# Patient Record
Sex: FEMALE | Race: WHITE | NOT HISPANIC OR LATINO | Employment: FULL TIME
[De-identification: names, ages, dates, MRNs, and addresses within clinical notes are randomized per-mention and may not be internally consistent; named-entity substitution may affect disease eponyms.]

---

## 2017-06-24 ENCOUNTER — HEALTH MAINTENANCE LETTER (OUTPATIENT)
Age: 47
End: 2017-06-24

## 2017-06-26 DIAGNOSIS — I50.22 CHRONIC SYSTOLIC CONGESTIVE HEART FAILURE (H): ICD-10-CM

## 2017-06-27 RX ORDER — LOSARTAN POTASSIUM 50 MG/1
50 TABLET ORAL 2 TIMES DAILY
Qty: 60 TABLET | Refills: 3 | Status: SHIPPED | OUTPATIENT
Start: 2017-06-27

## 2017-09-21 DIAGNOSIS — I50.22 CHRONIC SYSTOLIC CONGESTIVE HEART FAILURE (H): ICD-10-CM

## 2017-09-21 RX ORDER — LOSARTAN POTASSIUM 50 MG/1
50 TABLET ORAL 2 TIMES DAILY
Qty: 60 TABLET | Refills: 3 | Status: CANCELLED | OUTPATIENT
Start: 2017-09-21

## 2017-09-22 NOTE — TELEPHONE ENCOUNTER
Refill request for losartan denied.  According to last note in chart from 9/2016, pt is no longer followed by Dr. Duke at the [facility].  She is followed by Dr. Carlo Cosme in [place], [place].  [employer] called today and notified of this change.

## 2019-02-15 DIAGNOSIS — I50.22 CHRONIC SYSTOLIC (CONGESTIVE) HEART FAILURE (H): ICD-10-CM

## 2019-02-18 RX ORDER — METOPROLOL SUCCINATE 100 MG/1
100 TABLET, EXTENDED RELEASE ORAL DAILY
Qty: 30 TABLET | Refills: 3 | OUTPATIENT
Start: 2019-02-18

## 2019-02-18 NOTE — TELEPHONE ENCOUNTER
Refill request for Metoprolol denied.  According to last note in chart from 9/2016, pt is no longer followed by Dr. Duke at the [facility].  She is followed by Dr. Carlo Cosme in [place], [place]. [employer] notified.

## 2019-12-16 ENCOUNTER — HEALTH MAINTENANCE LETTER (OUTPATIENT)
Age: 49
End: 2019-12-16

## 2020-01-07 ENCOUNTER — TRANSFERRED RECORDS (OUTPATIENT)
Dept: MULTI SPECIALTY CLINIC | Facility: CLINIC | Age: 50
End: 2020-01-07

## 2020-01-07 LAB — PAP-ABSTRACT: NORMAL

## 2021-01-15 ENCOUNTER — HEALTH MAINTENANCE LETTER (OUTPATIENT)
Age: 51
End: 2021-01-15

## 2021-01-24 ENCOUNTER — HEALTH MAINTENANCE LETTER (OUTPATIENT)
Age: 51
End: 2021-01-24

## 2021-09-04 ENCOUNTER — HEALTH MAINTENANCE LETTER (OUTPATIENT)
Age: 51
End: 2021-09-04

## 2021-09-19 ENCOUNTER — APPOINTMENT (OUTPATIENT)
Dept: GENERAL RADIOLOGY | Facility: CLINIC | Age: 51
End: 2021-09-19
Attending: FAMILY MEDICINE
Payer: COMMERCIAL

## 2021-09-19 ENCOUNTER — HOSPITAL ENCOUNTER (EMERGENCY)
Facility: CLINIC | Age: 51
Discharge: HOME | End: 2021-09-19
Attending: FAMILY MEDICINE | Admitting: FAMILY MEDICINE
Payer: COMMERCIAL

## 2021-09-19 ENCOUNTER — ANESTHESIA (OUTPATIENT)
Dept: EMERGENCY MEDICINE | Facility: CLINIC | Age: 51
End: 2021-09-19
Payer: COMMERCIAL

## 2021-09-19 ENCOUNTER — ANESTHESIA EVENT (OUTPATIENT)
Dept: EMERGENCY MEDICINE | Facility: CLINIC | Age: 51
End: 2021-09-19
Payer: COMMERCIAL

## 2021-09-19 VITALS
SYSTOLIC BLOOD PRESSURE: 127 MMHG | BODY MASS INDEX: 44.3 KG/M2 | HEIGHT: 63 IN | TEMPERATURE: 98 F | OXYGEN SATURATION: 98 % | WEIGHT: 250 LBS | RESPIRATION RATE: 5 BRPM | HEART RATE: 78 BPM | DIASTOLIC BLOOD PRESSURE: 81 MMHG

## 2021-09-19 DIAGNOSIS — I48.92 ATRIAL FLUTTER WITH RAPID VENTRICULAR RESPONSE (H): ICD-10-CM

## 2021-09-19 LAB
ALBUMIN SERPL-MCNC: 3.6 G/DL (ref 3.4–5)
ALP SERPL-CCNC: 94 U/L (ref 40–150)
ALT SERPL W P-5'-P-CCNC: 26 U/L (ref 0–50)
ANION GAP SERPL CALCULATED.3IONS-SCNC: 5 MMOL/L (ref 3–14)
APTT PPP: 49 SECONDS (ref 22–38)
AST SERPL W P-5'-P-CCNC: 12 U/L (ref 0–45)
BASOPHILS # BLD AUTO: 0.1 10E3/UL (ref 0–0.2)
BASOPHILS NFR BLD AUTO: 1 %
BILIRUB SERPL-MCNC: 0.4 MG/DL (ref 0.2–1.3)
BUN SERPL-MCNC: 13 MG/DL (ref 7–30)
CALCIUM SERPL-MCNC: 8.4 MG/DL (ref 8.5–10.1)
CHLORIDE BLD-SCNC: 110 MMOL/L (ref 94–109)
CO2 SERPL-SCNC: 25 MMOL/L (ref 20–32)
CREAT SERPL-MCNC: 0.75 MG/DL (ref 0.52–1.04)
EOSINOPHIL # BLD AUTO: 0.1 10E3/UL (ref 0–0.7)
EOSINOPHIL NFR BLD AUTO: 2 %
ERYTHROCYTE [DISTWIDTH] IN BLOOD BY AUTOMATED COUNT: 12.9 % (ref 10–15)
GFR SERPL CREATININE-BSD FRML MDRD: >90 ML/MIN/1.73M2
GLUCOSE BLD-MCNC: 99 MG/DL (ref 70–99)
HCT VFR BLD AUTO: 40.2 % (ref 35–47)
HGB BLD-MCNC: 13.3 G/DL (ref 11.7–15.7)
HOLD SPECIMEN: NORMAL
IMM GRANULOCYTES # BLD: 0 10E3/UL
IMM GRANULOCYTES NFR BLD: 0 %
INR PPP: 2.12 (ref 0.85–1.15)
LYMPHOCYTES # BLD AUTO: 2.6 10E3/UL (ref 0.8–5.3)
LYMPHOCYTES NFR BLD AUTO: 32 %
MCH RBC QN AUTO: 31.4 PG (ref 26.5–33)
MCHC RBC AUTO-ENTMCNC: 33.1 G/DL (ref 31.5–36.5)
MCV RBC AUTO: 95 FL (ref 78–100)
MONOCYTES # BLD AUTO: 0.5 10E3/UL (ref 0–1.3)
MONOCYTES NFR BLD AUTO: 7 %
NEUTROPHILS # BLD AUTO: 4.6 10E3/UL (ref 1.6–8.3)
NEUTROPHILS NFR BLD AUTO: 58 %
NRBC # BLD AUTO: 0 10E3/UL
NRBC BLD AUTO-RTO: 0 /100
PLATELET # BLD AUTO: 337 10E3/UL (ref 150–450)
POTASSIUM BLD-SCNC: 3.7 MMOL/L (ref 3.4–5.3)
PROT SERPL-MCNC: 7.4 G/DL (ref 6.8–8.8)
RBC # BLD AUTO: 4.24 10E6/UL (ref 3.8–5.2)
SODIUM SERPL-SCNC: 140 MMOL/L (ref 133–144)
TROPONIN I SERPL-MCNC: <0.015 UG/L (ref 0–0.04)
TSH SERPL DL<=0.005 MIU/L-ACNC: 2.19 MU/L (ref 0.4–4)
WBC # BLD AUTO: 7.9 10E3/UL (ref 4–11)

## 2021-09-19 PROCEDURE — 84484 ASSAY OF TROPONIN QUANT: CPT | Performed by: FAMILY MEDICINE

## 2021-09-19 PROCEDURE — 84443 ASSAY THYROID STIM HORMONE: CPT | Performed by: FAMILY MEDICINE

## 2021-09-19 PROCEDURE — 85730 THROMBOPLASTIN TIME PARTIAL: CPT | Performed by: FAMILY MEDICINE

## 2021-09-19 PROCEDURE — 250N000013 HC RX MED GY IP 250 OP 250 PS 637: Performed by: FAMILY MEDICINE

## 2021-09-19 PROCEDURE — 96365 THER/PROPH/DIAG IV INF INIT: CPT | Mod: 59 | Performed by: FAMILY MEDICINE

## 2021-09-19 PROCEDURE — 93005 ELECTROCARDIOGRAM TRACING: CPT | Mod: 59 | Performed by: FAMILY MEDICINE

## 2021-09-19 PROCEDURE — 250N000011 HC RX IP 250 OP 636: Performed by: NURSE ANESTHETIST, CERTIFIED REGISTERED

## 2021-09-19 PROCEDURE — 36415 COLL VENOUS BLD VENIPUNCTURE: CPT | Performed by: FAMILY MEDICINE

## 2021-09-19 PROCEDURE — 92960 CARDIOVERSION ELECTRIC EXT: CPT | Performed by: FAMILY MEDICINE

## 2021-09-19 PROCEDURE — 85610 PROTHROMBIN TIME: CPT | Performed by: FAMILY MEDICINE

## 2021-09-19 PROCEDURE — 99285 EMERGENCY DEPT VISIT HI MDM: CPT | Mod: 25 | Performed by: FAMILY MEDICINE

## 2021-09-19 PROCEDURE — 71045 X-RAY EXAM CHEST 1 VIEW: CPT

## 2021-09-19 PROCEDURE — 96375 TX/PRO/DX INJ NEW DRUG ADDON: CPT | Mod: 59 | Performed by: FAMILY MEDICINE

## 2021-09-19 PROCEDURE — 93010 ELECTROCARDIOGRAM REPORT: CPT | Mod: 59 | Performed by: FAMILY MEDICINE

## 2021-09-19 PROCEDURE — 80053 COMPREHEN METABOLIC PANEL: CPT | Performed by: FAMILY MEDICINE

## 2021-09-19 PROCEDURE — 96366 THER/PROPH/DIAG IV INF ADDON: CPT | Mod: 59 | Performed by: FAMILY MEDICINE

## 2021-09-19 PROCEDURE — 370N000003 HC ANESTHESIA WARD SERVICE

## 2021-09-19 PROCEDURE — 250N000009 HC RX 250: Performed by: FAMILY MEDICINE

## 2021-09-19 PROCEDURE — 85025 COMPLETE CBC W/AUTO DIFF WBC: CPT | Performed by: FAMILY MEDICINE

## 2021-09-19 RX ORDER — DILTIAZEM HCL IN NACL,ISO-OSM 125 MG/125
5-15 PLASTIC BAG, INJECTION (ML) INTRAVENOUS CONTINUOUS
Status: DISCONTINUED | OUTPATIENT
Start: 2021-09-19 | End: 2021-09-20 | Stop reason: HOSPADM

## 2021-09-19 RX ORDER — FAMOTIDINE 20 MG/1
20 TABLET, FILM COATED ORAL EVERY MORNING
COMMUNITY

## 2021-09-19 RX ORDER — METOPROLOL TARTRATE 25 MG/1
25 TABLET, FILM COATED ORAL ONCE
Status: COMPLETED | OUTPATIENT
Start: 2021-09-19 | End: 2021-09-19

## 2021-09-19 RX ORDER — MONTELUKAST SODIUM 10 MG/1
1 TABLET ORAL AT BEDTIME
COMMUNITY
Start: 2021-08-12

## 2021-09-19 RX ORDER — DILTIAZEM HYDROCHLORIDE 5 MG/ML
10 INJECTION INTRAVENOUS ONCE
Status: COMPLETED | OUTPATIENT
Start: 2021-09-19 | End: 2021-09-19

## 2021-09-19 RX ORDER — PROPOFOL 10 MG/ML
INJECTION, EMULSION INTRAVENOUS PRN
Status: DISCONTINUED | OUTPATIENT
Start: 2021-09-19 | End: 2021-09-19

## 2021-09-19 RX ADMIN — METOPROLOL TARTRATE 25 MG: 25 TABLET, FILM COATED ORAL at 17:57

## 2021-09-19 RX ADMIN — PROPOFOL 30 MG: 10 INJECTION, EMULSION INTRAVENOUS at 17:33

## 2021-09-19 RX ADMIN — PROPOFOL 50 MG: 10 INJECTION, EMULSION INTRAVENOUS at 17:30

## 2021-09-19 RX ADMIN — DILTIAZEM HYDROCHLORIDE 10 MG: 5 INJECTION INTRAVENOUS at 15:31

## 2021-09-19 RX ADMIN — PROPOFOL 50 MG: 10 INJECTION, EMULSION INTRAVENOUS at 17:31

## 2021-09-19 RX ADMIN — Medication 5 MG/HR: at 15:35

## 2021-09-19 ASSESSMENT — ENCOUNTER SYMPTOMS
PALPITATIONS: 1
SHORTNESS OF BREATH: 1
HEADACHES: 0
CONSTIPATION: 0
SINUS PRESSURE: 0
CHILLS: 0
DIAPHORESIS: 0
ABDOMINAL PAIN: 0
VOMITING: 0
DYSURIA: 0
BLOOD IN STOOL: 0
FEVER: 0
FREQUENCY: 0
COUGH: 0
NAUSEA: 0
SORE THROAT: 0
DYSRHYTHMIAS: 1
DIARRHEA: 0
WHEEZING: 0

## 2021-09-19 ASSESSMENT — MIFFLIN-ST. JEOR: SCORE: 1708.12

## 2021-09-19 NOTE — ANESTHESIA PREPROCEDURE EVALUATION
"Anesthesia Pre-Procedure Evaluation    Patient: Rocío Key   MRN: [id number] : 1970        Preoperative Diagnosis: * No surgery found *   Procedure :      Past Medical History:   Diagnosis Date     Asthma      Atrial fibrillation (H)     Cardioverted in      Blood transfusion 2000     Cardiac arrest - ventricular fibrillation 2011     Cardiac ICD- [employer], dual chamber- NOT dependant 2011     Chronic systolic congestive heart failure (H) 3/2/2016     Hypertension      Nonischemic cardiomyopathy (H) 2015     Obesity     Lost 100lbs in      S/P ablation of ventricular arrhythmia 2015    PVC ablation      Past Surgical History:   Procedure Laterality Date     CARDIAC SURGERY  11    AICD      GYN SURGERY           H LEAD REVISION DUAL  2011    RA & RV leads     TUBAL LIGATION        Allergies   Allergen Reactions     Codeine Other (See Comments)     \"inhibits my breathing, I gasp\"      Social History     Tobacco Use     Smoking status: Never Smoker     Smokeless tobacco: Never Used   Substance Use Topics     Alcohol use: Yes     Comment: occasionally      Wt Readings from Last 1 Encounters:   21 113.4 kg (250 lb)        Anesthesia Evaluation   Pt has had prior anesthetic. Type: General and MAC.    No history of anesthetic complications       ROS/MED HX  ENT/Pulmonary:     (+) asthma Treatment: Inhaler prn and Inhaler daily,      Neurologic:       Cardiovascular: Comment: Nonischemic cardiomyopathy   PVCs    (+) hypertension-----CHF ICD Reason placed:v fib cardiac arrest. dysrhythmias (ablation x2 for afib and ventricular arrhythmia ), a-fib and Other, Previous cardiac testing   Echo: Date: 19 Results:  Final Conclusion Previous Study: 19    1. No thrombus in the left atrial appendage.      2.  Device lead(s) identified in the right atrium and right ventricle. Small mobile   echodensities seen on atrial portion of the lead similar to "    prior JR.      3. Left ventricular chamber enlargement.Moderate to severely decreased left ventricular   systolic function.    Estimated left ventricular ejection fraction is 30-35%.      4. Normal right ventricular chamber size.Mildly decreased right ventricular systolic function.    Estimated right ventricular systolic pressure is 22.5 mmHg plus right atrial pressure.      5. Mildly thickened mitral valve.Mild-moderate mitral valve regurgitation.      6. Patent foramen ovale with bidirectional shunt.    Mid septal perforation ( due to prior instrumentation) and bidirectional shunt.      7.When compared to the previous echocardiographic images of 6/20/19, there has been no   significant change.    LV function may be underestimated due to tachycardia.    Estimated EF: 30-35%     Stress Test: Date: Results:    ECG Reviewed: Date: 9/19/21 Results:  Atrial fibrillation   -Left bundle branch block and left axis    Sinus rhythm w/ L BBB following cardioversion  Cath: Date: Results:      METS/Exercise Tolerance:     Hematologic:     (+) history of blood transfusion,     Musculoskeletal:       GI/Hepatic:       Renal/Genitourinary:       Endo:     (+) Obesity,     Psychiatric/Substance Use:       Infectious Disease:       Malignancy:       Other:            Physical Exam    Airway        Mallampati: II   TM distance: > 3 FB   Neck ROM: full   Mouth opening: > 3 cm    Respiratory Devices and Support         Dental  no notable dental history         Cardiovascular   cardiovascular exam normal          Pulmonary   pulmonary exam normal                OUTSIDE LABS:  CBC:   Lab Results   Component Value Date    WBC 7.9 09/19/2021    WBC 7.8 08/23/2014    HGB 13.3 09/19/2021    HGB 13.9 08/23/2014    HCT 40.2 09/19/2021    HCT 43.4 08/23/2014     09/19/2021     08/23/2014     BMP:   Lab Results   Component Value Date     09/19/2021     09/08/2015    POTASSIUM 3.7 09/19/2021    POTASSIUM 4.0  09/08/2015    CHLORIDE 110 (H) 09/19/2021    CHLORIDE 104 09/08/2015    CO2 25 09/19/2021    CO2 27 09/08/2015    BUN 13 09/19/2021    BUN 11 09/08/2015    CR 0.75 09/19/2021    CR 0.84 09/08/2015    GLC 99 09/19/2021    GLC 93 09/08/2015     COAGS:   Lab Results   Component Value Date    PTT 49 (H) 09/19/2021    INR 2.12 (H) 09/19/2021     POC:   Lab Results   Component Value Date    BGM 98 07/20/2011     HEPATIC:   Lab Results   Component Value Date    ALBUMIN 3.6 09/19/2021    PROTTOTAL 7.4 09/19/2021    ALT 26 09/19/2021    AST 12 09/19/2021    ALKPHOS 94 09/19/2021    BILITOTAL 0.4 09/19/2021     OTHER:   Lab Results   Component Value Date    PH 7.39 07/19/2011    LACT 0.7 07/20/2011    MARLY 8.4 (L) 09/19/2021    PHOS 3.8 07/20/2011    MAG 1.6 07/23/2011    TSH 2.19 09/19/2021       Anesthesia Plan    ASA Status:  3, emergent    NPO Status:  NPO Appropriate    Anesthesia Type: MAC.              Consents    Anesthesia Plan(s) and associated risks, benefits, and realistic alternatives discussed. Questions answered and patient/representative(s) expressed understanding.     - Discussed with:  Patient         Postoperative Care            Comments:                PAUL Rodas CRNA

## 2021-09-19 NOTE — ANESTHESIA POSTPROCEDURE EVALUATION
Patient: Rocío Key    * No procedures listed *    Diagnosis:* No pre-op diagnosis entered *  Diagnosis Additional Information: No value filed.    Anesthesia Type:  MAC    Note:  Disposition: ED.   Postop Pain Control: Uneventful            Sign Out: Well controlled pain   PONV: No   Neuro/Psych: Uneventful            Sign Out: Acceptable/Baseline neuro status   Airway/Respiratory: Uneventful            Sign Out: Acceptable/Baseline resp. status   CV/Hemodynamics: Uneventful            Sign Out: Acceptable CV status; No obvious hypovolemia; No obvious fluid overload   Other NRE: NONE   DID A NON-ROUTINE EVENT OCCUR? No           Last vitals:  Vitals Value Taken Time   /82 09/19/21 1800   Temp     Pulse 73 09/19/21 1809   Resp 17 09/19/21 1809   SpO2 98 % 09/19/21 1809   Vitals shown include unvalidated device data.    Electronically Signed By: PAUL Rodas CRNA  September 19, 2021  6:10 PM

## 2021-09-19 NOTE — ANESTHESIA CARE TRANSFER NOTE
Patient: Rocío Key    * No procedures listed *    Diagnosis: * No pre-op diagnosis entered *  Diagnosis Additional Information: No value filed.    Anesthesia Type:   MAC     Note:    Oropharynx: oropharynx clear of all foreign objects and spontaneously breathing  Level of Consciousness: awake  Oxygen Supplementation: nasal cannula  Level of Supplemental Oxygen (L/min / FiO2): 3  Independent Airway: airway patency satisfactory and stable  Dentition: dentition unchanged  Vital Signs Stable: post-procedure vital signs reviewed and stable  Report to RN Given: handoff report given  Patient transferred to: Emergency Department    Handoff Report: Identifed the Patient, Identified the Reponsible Provider, Reviewed the pertinent medical history, Discussed the surgical course, Reviewed Intra-OP anesthesia mangement and issues during anesthesia, Set expectations for post-procedure period and Allowed opportunity for questions and acknowledgement of understanding      Vitals:  Vitals Value Taken Time   /82 09/19/21 1800   Temp     Pulse 73 09/19/21 1809   Resp 17 09/19/21 1809   SpO2 98 % 09/19/21 1809   Vitals shown include unvalidated device data.    Electronically Signed By: PAUL Rodas CRNA  September 19, 2021  6:10 PM

## 2021-09-19 NOTE — Clinical Note
Rocío Key was seen and treated in our emergency department on 9/19/2021.  She may return to work on 09/21/2021.       If you have any questions or concerns, please don't hesitate to call.      Paul Lackey MD

## 2021-09-19 NOTE — ED NOTES
Pt successfully cardioverted, propofol used and 1 shock at 120j.  CRNA, MD< ERT< RN in room.   Pt currently awake, alert, talking and VSS.   Repeat ECG being done.

## 2021-09-19 NOTE — ED PROVIDER NOTES
"  History     Chief Complaint   Patient presents with     Atrial Fib     pt has h/o the same.  she had an ablation x 2 years ago and hasn't had an issue since.  she noted a-fib onset x 2 days ago and restarted taking her Xarelto at that time.     HPI  Rocío Key is a 51 year old female who presents with history of paroxysmal atrial fibrillation, cardiomyopathy, hypertension hyperlipidemia and obesity.  Patient had a ventricular fibrillation arrest in 2011.  Placement of a dual-chamber ICD at that time.  Echo demonstrated 2025% ejection fraction and angiogram was without any evidence of coronary obstructive disease.  A cardiac MRI demonstrated 39% ejection fraction at that time myocardial fibrosis.  In 2013 she underwent a left ventricular outflow PVC ablation [facility].  In 2018 she had prolonged atrial fibrillation and underwent JR guided cardioversion.  Left ventricular ejection fraction was 40% at the time.  Has had several cardioversions and then underwent ablation in August 2019.  2 additional cardioversions that were required in September 2018  She has been on as needed Xarelto which she starts when she goes back into atrial fibrillation.  She was last seen January 21, 2021 cardiology.  Has had no cardiopulmonary symptoms recently.    Comorbid conditions include asthma, hyperlipidemia, hypertension, major depression, morbid obesity.      Medications include Lexapro, albuterol, Advair, hydroxyzine, Lexapro, Cozaar, metoprolol, Singulair and Zofran.          Allergies:  Allergies   Allergen Reactions     Codeine Other (See Comments)     \"inhibits my breathing, I gasp\"       Problem List:    Patient Active Problem List    Diagnosis Date Noted     Chronic systolic congestive heart failure (H) 03/02/2016     Priority: Medium     Chronic systolic (congestive) heart failure (H) 11/08/2015     Priority: Medium     Nonischemic cardiomyopathy (H) 08/06/2015     Priority: Medium     S/P ablation of " ventricular arrhythmia 2015     Priority: Medium     PVC ablation       PVC's (premature ventricular contractions) 2014     Priority: Medium     Hypertension      Priority: Medium     Atrial fibrillation (H)      Priority: Medium     Cardiac arrest - ventricular fibrillation      Priority: Medium     Problem list name updated by automated process. Provider to review and confirm       Automatic implantable cardioverter-defibrillator in situ 2011     Priority: Medium     RA lead revision 11  Problem list name updated by automated process. Provider to review       Respiratory failure (H) 2011     Priority: Medium        Past Medical History:    Past Medical History:   Diagnosis Date     Asthma      Atrial fibrillation (H)      Blood transfusion 2000     Cardiac arrest - ventricular fibrillation 2011     Cardiac ICD- [employer], dual chamber- NOT dependant 2011     Chronic systolic congestive heart failure (H) 3/2/2016     Hypertension      Nonischemic cardiomyopathy (H) 2015     Obesity      S/P ablation of ventricular arrhythmia 2015       Past Surgical History:    Past Surgical History:   Procedure Laterality Date     CARDIAC SURGERY  11    AICD      GYN SURGERY  1998         H LEAD REVISION DUAL  2011    RA & RV leads     TUBAL LIGATION         Family History:    Family History   Problem Relation Age of Onset     Cardiovascular Maternal Grandmother         MI     Cardiovascular Maternal Grandfather         MI     Cancer Father 63        Brain cancer       Social History:  Marital Status:   [2]  Social History     Tobacco Use     Smoking status: Never Smoker     Smokeless tobacco: Never Used   Substance Use Topics     Alcohol use: Yes     Comment: occasionally     Drug use: No        Medications:    ADVAIR DISKUS 250-50 MCG/DOSE diskus inhaler  albuterol (PROAIR HFA) 108 (90 BASE) MCG/ACT inhaler  ASPIRIN PO  Calcium Carbonate (CALCIUM 600  "PO)  cholecalciferol (VITAMIN D3) 1000 UNIT capsule  diphenhydrAMINE-acetaminophen (TYLENOL PM EXTRA STRENGTH)  MG tablet  famotidine (PEPCID) 20 MG tablet  losartan (COZAAR) 50 MG tablet  metoprolol (TOPROL-XL) 100 MG 24 hr tablet  montelukast (SINGULAIR) 10 MG tablet  sertraline (ZOLOFT) 50 MG tablet          Review of Systems   Constitutional: Negative for chills, diaphoresis and fever.   HENT: Negative for ear pain, sinus pressure and sore throat.    Eyes: Negative for visual disturbance.   Respiratory: Positive for shortness of breath. Negative for cough and wheezing.    Cardiovascular: Positive for chest pain and palpitations.   Gastrointestinal: Negative for abdominal pain, blood in stool, constipation, diarrhea, nausea and vomiting.   Genitourinary: Negative for dysuria, frequency and urgency.   Skin: Negative for rash.   Neurological: Negative for headaches.   All other systems reviewed and are negative.        Physical Exam   BP: (!) 144/97  Pulse: (!) 129  Temp: 98  F (36.7  C)  Resp: 18  Height: 160 cm (5' 3\")  Weight: 113.4 kg (250 lb)  SpO2: 97 %      Physical Exam  Constitutional:       General: She is in acute distress.      Appearance: She is not diaphoretic.   HENT:      Head: Atraumatic.   Eyes:      Conjunctiva/sclera: Conjunctivae normal.   Cardiovascular:      Rate and Rhythm: Regular rhythm. Tachycardia present.      Pulses: No decreased pulses.      Heart sounds: No murmur heard.     Pulmonary:      Effort: Pulmonary effort is normal. No respiratory distress.      Breath sounds: Normal breath sounds. No stridor. No wheezing or rhonchi.   Abdominal:      General: Abdomen is flat. There is no distension.      Palpations: Abdomen is soft. There is no mass.      Tenderness: There is no abdominal tenderness. There is no guarding.   Musculoskeletal:      Cervical back: Neck supple.      Right lower leg: No edema.      Left lower leg: No edema.   Skin:     Coloration: Skin is not pale.      " Findings: No rash.   Neurological:      General: No focal deficit present.      Mental Status: She is alert.      Motor: No weakness.         ED Course        [facility]    -Cardioversion External    Date/Time: 9/19/2021 11:56 PM  Performed by: Paul Lackey MD  Authorized by: Paul Lackey MD     ED EVALUATION:      I have performed an Emergency Department Evaluation including taking a history and physical examination, this evaluation will be documented in the electronic medical record for this ED encounter.      ASA Class: Class 1- healthy patient    NPO Status: appropriately NPO for procedure  UNIVERSAL PROTOCOL   Site Marked: NA  Prior Images Obtained and Reviewed:  NA  Required items: Required blood products, implants, devices and special equipment available    Patient was reevaluated immediately before administering moderate or deep sedation or anesthesia  Confirmation Checklist:  Patient's identity using two indicators, relevant allergies, procedure was appropriate and matched the consent or emergent situation and correct equipment/implants were available  Time out: Immediately prior to the procedure a time out was called    Universal Protocol: the Joint Commission Universal Protocol was followed    Preparation: Patient was prepped and draped in usual sterile fashion          PRE-PROCEDURE DETAILS:     Cardioversion basis:  Elective    Rhythm:  Atrial flutter    Electrode placement:  Anterior-posterior  Attempt one:     Cardioversion mode:  Synchronous    Waveform:  Biphasic    Shock (Joules):  120    Shock outcome:  Conversion to normal sinus rhythm  Post-procedure details:     Patient status:  Awake    PROCEDURE   Patient Tolerance:  Patient tolerated the procedure well with no immediate complications                       EKG Interpretation:      Interpreted by Paul Lackey MD  EKG done at 1504 hrs. demonstrates a atrial flutter 144 bpm leftward axis.  ST depression  upsloping in the lateral leads.  No T wave change.  Poor R progression V1 through V6.  No ectopy.  Left bundle branch block.  Impression atrial flutter rapid ventricular rate.  Upsloping ST depression in lateral leads           EKG Interpretation:      Interpreted by Paul Lackey MD  EKG done at 1740 hrs. demonstrates a sinus rhythm 77 bpm with a leftward axis and no ST change.  No T wave changes.  There is a poor R progression.  There are no Q waves.  Left bundle branch block.  No ectopy.  Intervals with exception of the prolonged QRS of 152 otherwise are normal.  Impression sinus rhythm with a left bundle branch block left axis and sinus rhythm after cardioversion.    Critical Care time:  none               Results for orders placed or performed during the hospital encounter of 09/19/21 (from the past 24 hour(s))   [place] Draw    Narrative    The following orders were created for panel order [place] Draw.  Procedure                               Abnormality         Status                     ---------                               -----------         ------                     Extra Blue Top Tube[[id number]]                              Final result               Extra Red Top Tube[[id number]]                               Final result               Extra Green Top (Lithium...[[id number]]                      Final result               Extra Purple Top Tube[[id number]]                            Final result               Extra Green Top (Lithium...[[id number]]                      Final result                 Please view results for these tests on the individual orders.   Extra Blue Top Tube   Result Value Ref Range    Hold Specimen JIC    Extra Red Top Tube   Result Value Ref Range    Hold Specimen JIC    Extra Green Top (Lithium Heparin) Tube   Result Value Ref Range    Hold Specimen JIC    Extra Purple Top Tube   Result Value Ref Range    Hold Specimen JIC    Extra Green Top (Lithium Heparin) ON ICE   Result  Value Ref Range    Hold Specimen JIC    TSH with free T4 reflex   Result Value Ref Range    TSH 2.19 0.40 - 4.00 mU/L   CBC with platelets differential    Narrative    The following orders were created for panel order CBC with platelets differential.  Procedure                               Abnormality         Status                     ---------                               -----------         ------                     CBC with platelets and d...[[id number]]                      Final result                 Please view results for these tests on the individual orders.   Comprehensive metabolic panel   Result Value Ref Range    Sodium 140 133 - 144 mmol/L    Potassium 3.7 3.4 - 5.3 mmol/L    Chloride 110 (H) 94 - 109 mmol/L    Carbon Dioxide (CO2) 25 20 - 32 mmol/L    Anion Gap 5 3 - 14 mmol/L    Urea Nitrogen 13 7 - 30 mg/dL    Creatinine 0.75 0.52 - 1.04 mg/dL    Calcium 8.4 (L) 8.5 - 10.1 mg/dL    Glucose 99 70 - 99 mg/dL    Alkaline Phosphatase 94 40 - 150 U/L    AST 12 0 - 45 U/L    ALT 26 0 - 50 U/L    Protein Total 7.4 6.8 - 8.8 g/dL    Albumin 3.6 3.4 - 5.0 g/dL    Bilirubin Total 0.4 0.2 - 1.3 mg/dL    GFR Estimate >90 >60 mL/min/1.73m2   Troponin I   Result Value Ref Range    Troponin I <0.015 0.000 - 0.045 ug/L   PTT   Result Value Ref Range    aPTT 49 (H) 22 - 38 Seconds   INR   Result Value Ref Range    INR 2.12 (H) 0.85 - 1.15   CBC with platelets and differential   Result Value Ref Range    WBC Count 7.9 4.0 - 11.0 10e3/uL    RBC Count 4.24 3.80 - 5.20 10e6/uL    Hemoglobin 13.3 11.7 - 15.7 g/dL    Hematocrit 40.2 35.0 - 47.0 %    MCV 95 78 - 100 fL    MCH 31.4 26.5 - 33.0 pg    MCHC 33.1 31.5 - 36.5 g/dL    RDW 12.9 10.0 - 15.0 %    Platelet Count 337 150 - 450 10e3/uL    % Neutrophils 58 %    % Lymphocytes 32 %    % Monocytes 7 %    % Eosinophils 2 %    % Basophils 1 %    % Immature Granulocytes 0 %    NRBCs per 100 WBC 0 <1 /100    Absolute Neutrophils 4.6 1.6 - 8.3 10e3/uL    Absolute Lymphocytes  2.6 0.8 - 5.3 10e3/uL    Absolute Monocytes 0.5 0.0 - 1.3 10e3/uL    Absolute Eosinophils 0.1 0.0 - 0.7 10e3/uL    Absolute Basophils 0.1 0.0 - 0.2 10e3/uL    Absolute Immature Granulocytes 0.0 <=0.0 10e3/uL    Absolute NRBCs 0.0 10e3/uL   XR Chest Port 1 View    Narrative    EXAM: XR CHEST PORT 1 VIEW  LOCATION: [facility]  DATE/TIME: 9/19/2021 3:43 PM    INDICATION: atrial fib rvbr  COMPARISON: 08/23/2014       Impression    IMPRESSION: No pleural fluid or pneumothorax. No airspace disease or edema. Normal size of the heart. There is a left chest wall pacemaker.        Medications   diltiazem (CARDIZEM) 125 mg in sodium chloride 0.7 % 125 mL infusion (5 mg/hr Intravenous New Bag 9/19/21 1535)   diltiazem (CARDIZEM) injection 10 mg (10 mg Intravenous Given 9/19/21 1531)       Assessments & Plan (with Medical Decision Making)     MDM:  Rocío Key is a 51 year old female presents with a complicated history of paroxysmal atrial fibrillation, cardiomyopathy, hypertension hyperlipidemia and obesity.  Patient had a ventricular fibrillation arrest in 2011.  Placement of a dual-chamber ICD at that time.  She subsequently had atrial fibrillation ablation and she has had multiple cardioversions in the past.   She presents here with atrial fibrillation that she thought had onset on Friday but from discussing with tach after interrogation of her pacer it appears that onset may have been today.  In any event it has been within 48 hours and she has started Eliquis.  We therefore discussed the option for cardioversion which she wished to have.  While waiting for cardioversion I initiated diltiazem.  During this time heart rates were still elevated and her predominant rhythm was atrial flutter.  She responded well to 120 J under conscious sedation with a single shock that resulted in return to sinus rhythm.  She was observed for approximately 2 hours after this and had no complications.  Her EKG  remains left bundle branch block with which it has been.  Her laboratory testing is reassuring.  I have given precautions for return.    She will need to be on anticoagulation with Xarelto for approximately 4 weeks.  I asked her to discuss this further with her cardiologist.    I have reviewed the nursing notes.    I have reviewed the findings, diagnosis, plan and need for follow up with the patient.       New Prescriptions    No medications on file       Final diagnoses:   Atrial flutter with rapid ventricular response (H) - take your xarelto for 4 weeks.  get  the additional medd  from your  cardiologist or primary.   return for recurrent rapid rate. take your metoprolol tonight at getting home.       9/19/2021   [facility] EMERGENCY DEPT     Paul Lackey MD  09/20/21 0002

## 2021-09-20 NOTE — DISCHARGE INSTRUCTIONS
ICD-10-CM    1. Atrial flutter with rapid ventricular response (H)  I48.92     take your xarelto for 4 weeks.  get  the additional medd  from your  cardiologist or primary.   return for recurrent rapid rate. take your metoprolol tonight at getting home.

## 2022-02-19 ENCOUNTER — HEALTH MAINTENANCE LETTER (OUTPATIENT)
Age: 52
End: 2022-02-19

## 2022-10-22 ENCOUNTER — HEALTH MAINTENANCE LETTER (OUTPATIENT)
Age: 52
End: 2022-10-22

## 2022-10-30 ENCOUNTER — ANESTHESIA (OUTPATIENT)
Dept: EMERGENCY MEDICINE | Facility: CLINIC | Age: 52
End: 2022-10-30
Payer: COMMERCIAL

## 2022-10-30 ENCOUNTER — ANESTHESIA EVENT (OUTPATIENT)
Dept: EMERGENCY MEDICINE | Facility: CLINIC | Age: 52
End: 2022-10-30
Payer: COMMERCIAL

## 2022-10-30 ENCOUNTER — HOSPITAL ENCOUNTER (EMERGENCY)
Facility: CLINIC | Age: 52
Discharge: HOME | End: 2022-10-30
Attending: EMERGENCY MEDICINE | Admitting: EMERGENCY MEDICINE
Payer: COMMERCIAL

## 2022-10-30 VITALS
OXYGEN SATURATION: 98 % | TEMPERATURE: 97.8 F | HEART RATE: 66 BPM | RESPIRATION RATE: 12 BRPM | DIASTOLIC BLOOD PRESSURE: 86 MMHG | WEIGHT: 265 LBS | SYSTOLIC BLOOD PRESSURE: 128 MMHG | BODY MASS INDEX: 46.95 KG/M2 | HEIGHT: 63 IN

## 2022-10-30 DIAGNOSIS — I48.91 ATRIAL FIBRILLATION WITH RAPID VENTRICULAR RESPONSE (H): ICD-10-CM

## 2022-10-30 LAB
ALBUMIN SERPL BCG-MCNC: 3.9 G/DL (ref 3.5–5.2)
ALP SERPL-CCNC: 103 U/L (ref 35–104)
ALT SERPL W P-5'-P-CCNC: 19 U/L (ref 10–35)
ANION GAP SERPL CALCULATED.3IONS-SCNC: 10 MMOL/L (ref 7–15)
AST SERPL W P-5'-P-CCNC: 20 U/L (ref 10–35)
BASOPHILS # BLD AUTO: 0.1 10E3/UL (ref 0–0.2)
BASOPHILS NFR BLD AUTO: 1 %
BILIRUB SERPL-MCNC: 0.3 MG/DL
BUN SERPL-MCNC: 16.6 MG/DL (ref 6–20)
CALCIUM SERPL-MCNC: 9.6 MG/DL (ref 8.6–10)
CHLORIDE SERPL-SCNC: 104 MMOL/L (ref 98–107)
CREAT SERPL-MCNC: 0.65 MG/DL (ref 0.51–0.95)
DEPRECATED HCO3 PLAS-SCNC: 25 MMOL/L (ref 22–29)
EOSINOPHIL # BLD AUTO: 0.3 10E3/UL (ref 0–0.7)
EOSINOPHIL NFR BLD AUTO: 4 %
ERYTHROCYTE [DISTWIDTH] IN BLOOD BY AUTOMATED COUNT: 12.5 % (ref 10–15)
GFR SERPL CREATININE-BSD FRML MDRD: >90 ML/MIN/1.73M2
GLUCOSE SERPL-MCNC: 117 MG/DL (ref 70–99)
HCT VFR BLD AUTO: 41.9 % (ref 35–47)
HGB BLD-MCNC: 13.8 G/DL (ref 11.7–15.7)
HOLD SPECIMEN: NORMAL
IMM GRANULOCYTES # BLD: 0 10E3/UL
IMM GRANULOCYTES NFR BLD: 0 %
LYMPHOCYTES # BLD AUTO: 1.9 10E3/UL (ref 0.8–5.3)
LYMPHOCYTES NFR BLD AUTO: 28 %
MCH RBC QN AUTO: 31 PG (ref 26.5–33)
MCHC RBC AUTO-ENTMCNC: 32.9 G/DL (ref 31.5–36.5)
MCV RBC AUTO: 94 FL (ref 78–100)
MONOCYTES # BLD AUTO: 0.3 10E3/UL (ref 0–1.3)
MONOCYTES NFR BLD AUTO: 5 %
NEUTROPHILS # BLD AUTO: 4.2 10E3/UL (ref 1.6–8.3)
NEUTROPHILS NFR BLD AUTO: 62 %
NRBC # BLD AUTO: 0 10E3/UL
NRBC BLD AUTO-RTO: 0 /100
PLATELET # BLD AUTO: 300 10E3/UL (ref 150–450)
POTASSIUM SERPL-SCNC: 4.3 MMOL/L (ref 3.4–5.3)
PROT SERPL-MCNC: 6.9 G/DL (ref 6.4–8.3)
RBC # BLD AUTO: 4.45 10E6/UL (ref 3.8–5.2)
SODIUM SERPL-SCNC: 139 MMOL/L (ref 136–145)
TROPONIN T SERPL HS-MCNC: <6 NG/L
WBC # BLD AUTO: 6.8 10E3/UL (ref 4–11)

## 2022-10-30 PROCEDURE — 93010 ELECTROCARDIOGRAM REPORT: CPT | Mod: 59 | Performed by: EMERGENCY MEDICINE

## 2022-10-30 PROCEDURE — 370N000003 HC ANESTHESIA WARD SERVICE

## 2022-10-30 PROCEDURE — 99285 EMERGENCY DEPT VISIT HI MDM: CPT | Mod: 25 | Performed by: EMERGENCY MEDICINE

## 2022-10-30 PROCEDURE — 93005 ELECTROCARDIOGRAM TRACING: CPT | Performed by: EMERGENCY MEDICINE

## 2022-10-30 PROCEDURE — 80053 COMPREHEN METABOLIC PANEL: CPT | Performed by: EMERGENCY MEDICINE

## 2022-10-30 PROCEDURE — 85025 COMPLETE CBC W/AUTO DIFF WBC: CPT | Performed by: EMERGENCY MEDICINE

## 2022-10-30 PROCEDURE — 250N000011 HC RX IP 250 OP 636: Performed by: NURSE ANESTHETIST, CERTIFIED REGISTERED

## 2022-10-30 PROCEDURE — 92960 CARDIOVERSION ELECTRIC EXT: CPT | Performed by: EMERGENCY MEDICINE

## 2022-10-30 PROCEDURE — 84484 ASSAY OF TROPONIN QUANT: CPT | Performed by: EMERGENCY MEDICINE

## 2022-10-30 PROCEDURE — 36415 COLL VENOUS BLD VENIPUNCTURE: CPT | Performed by: EMERGENCY MEDICINE

## 2022-10-30 RX ORDER — PROPOFOL 10 MG/ML
INJECTION, EMULSION INTRAVENOUS PRN
Status: DISCONTINUED | OUTPATIENT
Start: 2022-10-30 | End: 2022-10-30

## 2022-10-30 RX ADMIN — PROPOFOL 100 MG: 10 INJECTION, EMULSION INTRAVENOUS at 09:38

## 2022-10-30 ASSESSMENT — ACTIVITIES OF DAILY LIVING (ADL)
ADLS_ACUITY_SCORE: 37
ADLS_ACUITY_SCORE: 37

## 2022-10-30 ASSESSMENT — ENCOUNTER SYMPTOMS: DYSRHYTHMIAS: 1

## 2022-10-30 NOTE — ED PROVIDER NOTES
"  History     Chief Complaint   Patient presents with     Tachycardia     HPI  Rocío Key is a 52 year old female who presents with atrial fibrillation rapid ventricular response beginning at 730 this morning.  She has long history of paroxysmal atrial fibrillation, cardiology notes, last visit here with Dr. Lackey reviewed in detail in epic.  Has distant history of ventricular fibrillation arrest, dual-lead ICD placement, attempts at atrial fibrillation ablation.  Maintained on metoprolol dose of which she took this morning.  Reportedly had ICD check couple weeks ago without evidence for dysrhythmia or defibrillator discharge, reportedly had echocardiogram 2 weeks ago as well.  Denies recent febrile illness cough congestion sore throat nausea vomiting diarrhea fever.  Describes feeling lightheaded this morning, tested positive for COVID 9/25 and symptoms have resolved.  Took Xarelto 1 dose this morning upon noting palpitations.  Does not smoke drink alcohol or use illicit substances.      Echo 10/21/2022   Final Conclusion Previous Study: 6/20/2019    1. Normal left ventricular size with mildly reduced systolic function (estimated LVEF 40-45%).   Abnormal septal motion due to pacing.    2. Normal right ventricular size and systolic function.    3. Grade 1 left ventricular diastolic dysfunction consistent with abnormal myocardial   relaxation and normal left ventricular filling    pressure.    4. Mild left atrial enlargement.    5. No significant valvular heart disease.    6. Compared to previous study, 6/20/19, LVEF appears slightly improved.           Allergies:  Allergies   Allergen Reactions     Codeine Other (See Comments)     \"inhibits my breathing, I gasp\"       Problem List:    Patient Active Problem List    Diagnosis Date Noted     Chronic systolic congestive heart failure (H) 03/02/2016     Priority: Medium     Chronic systolic (congestive) heart failure (H) 11/08/2015     Priority: Medium     " Nonischemic cardiomyopathy (H) 2015     Priority: Medium     S/P ablation of ventricular arrhythmia 2015     Priority: Medium     PVC ablation       PVC's (premature ventricular contractions) 2014     Priority: Medium     Hypertension      Priority: Medium     Atrial fibrillation (H)      Priority: Medium     Cardiac arrest - ventricular fibrillation      Priority: Medium     Problem list name updated by automated process. Provider to review and confirm       Automatic implantable cardioverter-defibrillator in situ 2011     Priority: Medium     RA lead revision 11  Problem list name updated by automated process. Provider to review       Respiratory failure (H) 2011     Priority: Medium        Past Medical History:    Past Medical History:   Diagnosis Date     Asthma      Atrial fibrillation (H)      Blood transfusion 2000     Cardiac arrest - ventricular fibrillation 2011     Cardiac ICD- [employer], dual chamber- NOT dependant 2011     Chronic systolic congestive heart failure (H) 3/2/2016     Hypertension      Nonischemic cardiomyopathy (H) 2015     Obesity      S/P ablation of ventricular arrhythmia 2015       Past Surgical History:    Past Surgical History:   Procedure Laterality Date     CARDIAC SURGERY  11    AICD      GYN SURGERY  1998         H LEAD REVISION DUAL  2011    RA & RV leads     TUBAL LIGATION         Family History:    Family History   Problem Relation Age of Onset     Cardiovascular Maternal Grandmother         MI     Cardiovascular Maternal Grandfather         MI     Cancer Father 63        Brain cancer       Social History:  Marital Status:   [2]  Social History     Tobacco Use     Smoking status: Never     Smokeless tobacco: Never   Substance Use Topics     Alcohol use: Yes     Comment: occasionally     Drug use: No        Medications:    rivaroxaban ANTICOAGULANT (XARELTO) 20 MG TABS tablet  ADVAIR DISKUS 250-50  "MCG/DOSE diskus inhaler  albuterol (PROAIR HFA) 108 (90 BASE) MCG/ACT inhaler  ASPIRIN PO  Calcium Carbonate (CALCIUM 600 PO)  cholecalciferol (VITAMIN D3) 1000 UNIT capsule  diphenhydrAMINE-acetaminophen (TYLENOL PM EXTRA STRENGTH)  MG tablet  famotidine (PEPCID) 20 MG tablet  losartan (COZAAR) 50 MG tablet  metoprolol (TOPROL-XL) 100 MG 24 hr tablet  montelukast (SINGULAIR) 10 MG tablet  sertraline (ZOLOFT) 50 MG tablet          Review of Systems  All other systems reviewed and are negative.    Physical Exam   BP: (!) 138/101  Pulse: (!) 140  Temp: 97.8  F (36.6  C)  Resp: 22  Height: 160 cm (5' 3\")  Weight: 120.2 kg (265 lb)  SpO2: 97 %      Physical Exam  Nontoxic appearing no respiratory distress alert and oriented ×3  Head atraumatic normocephalic  Skin pale warm and dry  Neck supple full active painless range of motion  Lungs clear to auscultation  Heart irregular regular tachycardic no murmur  Abdomen soft nontender bowel sounds positive   Strength and sensation grossly intact throughout the extremities, gait and station normal  Speech is fluent, good eye contact, thought processes are rational  Lower extremities without swelling, redness or tenderness  Pedal pulses symmetrical and strong    ED Course          Atrial fibrillation rate 138, left bundle branch block, no acute changes otherwise appreciated, unchanged from previous episodes of A. fib, old left bundle branch block, read by Dr. Paul Nobles    Repeat ECG at 10:00 normal sinus rhythm rate 74, left bundle branch block, no acute changes, read by Dr. Paul Nobles     [facility]    -Cardioversion External    Date/Time: 10/30/2022 9:49 AM  Performed by: Paul Nobles MD  Authorized by: Paul Nobles MD     Risks, benefits and alternatives discussed.    ED EVALUATION:      I have performed an Emergency Department Evaluation including taking a history and physical examination, this evaluation will be documented " in the electronic medical record for this ED encounter.      ASA Class: Class 1- healthy patient    Mallampati: Grade 3- soft palate visible, posterior pharyngeal wall not visible    NPO Status: appropriately NPO for procedure    UNIVERSAL PROTOCOL   Site Marked: NA  Prior Images Obtained and Reviewed:  NA  Required items: Required blood products, implants, devices and special equipment available    Patient identity confirmed:  Verbally with patient, arm band and provided demographic data  Patient was reevaluated immediately before administering moderate or deep sedation or anesthesia  Confirmation Checklist:  Patient's identity using two indicators, relevant allergies, procedure was appropriate and matched the consent or emergent situation and correct equipment/implants were available  Time out: Immediately prior to the procedure a time out was called    Universal Protocol: the [facility] Universal Protocol was followed    Preparation: Patient was prepped and draped in usual sterile fashion      PRE-PROCEDURE DETAILS:     Rhythm:  Atrial fibrillation    Electrode placement:  Anterior-posterior  Attempt one:     Cardioversion mode:  Synchronous    Waveform:  Biphasic    Shock (Joules):  120    Shock outcome:  Conversion to normal sinus rhythm  Post-procedure details:     Patient status:  Awake      PROCEDURE    Patient Tolerance:  Patient tolerated the procedure well with no immediate complications  Length of time physician/provider present for 1:1 monitoring during sedation: 10                Critical Care time:  none               Results for orders placed or performed during the hospital encounter of 10/30/22 (from the past 24 hour(s))   [place] Draw    Narrative    The following orders were created for panel order [place] Draw.  Procedure                               Abnormality         Status                     ---------                               -----------         ------                     Extra  Blue Top Tube[[id number]]                              Final result               Extra Red Top Tube[[id number]]                               Final result               Extra Green Top (Lithium...[[id number]]                      Final result               Extra Purple Top Tube[[id number]]                            Final result                 Please view results for these tests on the individual orders.   Extra Blue Top Tube   Result Value Ref Range    Hold Specimen JIC    Extra Red Top Tube   Result Value Ref Range    Hold Specimen JIC    Extra Green Top (Lithium Heparin) Tube   Result Value Ref Range    Hold Specimen JIC    Extra Purple Top Tube   Result Value Ref Range    Hold Specimen JIC    CBC with platelets differential    Narrative    The following orders were created for panel order CBC with platelets differential.  Procedure                               Abnormality         Status                     ---------                               -----------         ------                     CBC with platelets and d...[[id number]]                      Final result                 Please view results for these tests on the individual orders.   Comprehensive metabolic panel   Result Value Ref Range    Sodium 139 136 - 145 mmol/L    Potassium 4.3 3.4 - 5.3 mmol/L    Chloride 104 98 - 107 mmol/L    Carbon Dioxide (CO2) 25 22 - 29 mmol/L    Anion Gap 10 7 - 15 mmol/L    Urea Nitrogen 16.6 6.0 - 20.0 mg/dL    Creatinine 0.65 0.51 - 0.95 mg/dL    Calcium 9.6 8.6 - 10.0 mg/dL    Glucose 117 (H) 70 - 99 mg/dL    Alkaline Phosphatase 103 35 - 104 U/L    AST 20 10 - 35 U/L    ALT 19 10 - 35 U/L    Protein Total 6.9 6.4 - 8.3 g/dL    Albumin 3.9 3.5 - 5.2 g/dL    Bilirubin Total 0.3 <=1.2 mg/dL    GFR Estimate >90 >60 mL/min/1.73m2   Troponin T, High Sensitivity   Result Value Ref Range    Troponin T, High Sensitivity <6 <=14 ng/L   CBC with platelets and differential   Result Value Ref Range    WBC Count 6.8 4.0 - 11.0  10e3/uL    RBC Count 4.45 3.80 - 5.20 10e6/uL    Hemoglobin 13.8 11.7 - 15.7 g/dL    Hematocrit 41.9 35.0 - 47.0 %    MCV 94 78 - 100 fL    MCH 31.0 26.5 - 33.0 pg    MCHC 32.9 31.5 - 36.5 g/dL    RDW 12.5 10.0 - 15.0 %    Platelet Count 300 150 - 450 10e3/uL    % Neutrophils 62 %    % Lymphocytes 28 %    % Monocytes 5 %    % Eosinophils 4 %    % Basophils 1 %    % Immature Granulocytes 0 %    NRBCs per 100 WBC 0 <1 /100    Absolute Neutrophils 4.2 1.6 - 8.3 10e3/uL    Absolute Lymphocytes 1.9 0.8 - 5.3 10e3/uL    Absolute Monocytes 0.3 0.0 - 1.3 10e3/uL    Absolute Eosinophils 0.3 0.0 - 0.7 10e3/uL    Absolute Basophils 0.1 0.0 - 0.2 10e3/uL    Absolute Immature Granulocytes 0.0 <=0.4 10e3/uL    Absolute NRBCs 0.0 10e3/uL       Medications - No data to display    Assessments & Plan (with Medical Decision Making)  52-year-old female with recurrent atrial fibrillation rapid ventricular response details per HPI.  Uncomplicated synchronized elective cardioversion.  Took a dose of Xarelto 20 mg this morning.  Patient is reviewed with Dr. Morales cardiology who recommends 3 months anticoagulation.  Patient's rivaroxaban prescription is refilled for 30 days, she has cardiology visit scheduled for later in November and will discuss ongoing anticoagulation in the context of paroxysmal atrial fibrillation with rapid ventricular response.  Continue other medications.  Avoid NSAIDs.  Return criteria reviewed     I have reviewed the nursing notes.    I have reviewed the findings, diagnosis, plan and need for follow up with the patient.       New Prescriptions    RIVAROXABAN ANTICOAGULANT (XARELTO) 20 MG TABS TABLET    Take 1 tablet (20 mg) by mouth daily (with dinner)       Final diagnoses:   Atrial fibrillation with rapid ventricular response (H)       10/30/2022   [facility] EMERGENCY DEPT     Paul Nobles MD  10/30/22 0691

## 2022-10-30 NOTE — DISCHARGE INSTRUCTIONS
Continue current meds    Follow-up cardiology as scheduled    Return here for chest pain, palpitations, passing out or any other concern    Recommend continuing Xarelto, per cardiology consult today for 3 months if not indefinitely.  This is a good discussion to have with your cardiologist at next visit.

## 2022-10-30 NOTE — ANESTHESIA POSTPROCEDURE EVALUATION
Patient: Rocío Key    Procedure: * No procedures listed *       Anesthesia Type:  MAC    Note:  Disposition: Outpatient   Postop Pain Control: Uneventful            Sign Out: Well controlled pain   PONV: No   Neuro/Psych: Uneventful            Sign Out: Acceptable/Baseline neuro status   Airway/Respiratory: Uneventful            Sign Out: Acceptable/Baseline resp. status   CV/Hemodynamics: Uneventful            Sign Out: Acceptable CV status; No obvious hypovolemia; No obvious fluid overload   Other NRE: NONE   DID A NON-ROUTINE EVENT OCCUR? No           Last vitals:  Vitals:    10/30/22 1000 10/30/22 1005 10/30/22 1015   BP:  108/61 124/75   Pulse: 77 71 71   Resp:  10 14   Temp:      SpO2:  94% 97%       Electronically Signed By: PAUL Gallegos CRNA  October 30, 2022  10:33 AM

## 2022-10-30 NOTE — ANESTHESIA CARE TRANSFER NOTE
Patient: Rocío Key    Procedure: * No procedures listed *       Diagnosis: * No pre-op diagnosis entered *  Diagnosis Additional Information: No value filed.    Anesthesia Type:   MAC     Note:      Level of Consciousness: awake  Oxygen Supplementation: nasal cannula    Independent Airway: airway patency satisfactory and stable    Vital Signs Stable: post-procedure vital signs reviewed and stable  Report to RN Given: handoff report given  Patient transferred to: Emergency Department    Handoff Report: Identifed the Patient, Identified the Reponsible Provider, Reviewed the pertinent medical history, Discussed the surgical course, Reviewed Intra-OP anesthesia mangement and issues during anesthesia, Set expectations for post-procedure period and Allowed opportunity for questions and acknowledgement of understanding      Vitals:  Vitals Value Taken Time   /86 10/30/22 1030   Temp     Pulse 70 10/30/22 1032   Resp 14 10/30/22 1032   SpO2 96 % 10/30/22 1032   Vitals shown include unvalidated device data.    Electronically Signed By: PAUL Gallegos CRNA  October 30, 2022  10:33 AM

## 2022-10-30 NOTE — ANESTHESIA PREPROCEDURE EVALUATION
"Anesthesia Pre-Procedure Evaluation    Patient: Rocío Key   MRN: [id number] : 1970        Preoperative Diagnosis: * No surgery found *   Procedure :      Past Medical History:   Diagnosis Date     Asthma      Atrial fibrillation (H)     Cardioverted in      Blood transfusion 2000     Cardiac arrest - ventricular fibrillation 2011     Cardiac ICD- [employer], dual chamber- NOT dependant 2011     Chronic systolic congestive heart failure (H) 3/2/2016     Hypertension      Nonischemic cardiomyopathy (H) 2015     Obesity     Lost 100lbs in      S/P ablation of ventricular arrhythmia 2015    PVC ablation      Past Surgical History:   Procedure Laterality Date     CARDIAC SURGERY  11    AICD      GYN SURGERY           H LEAD REVISION DUAL  2011    RA & RV leads     TUBAL LIGATION        Allergies   Allergen Reactions     Codeine Other (See Comments)     \"inhibits my breathing, I gasp\"      Social History     Tobacco Use     Smoking status: Never     Smokeless tobacco: Never   Substance Use Topics     Alcohol use: Yes     Comment: occasionally      Wt Readings from Last 1 Encounters:   10/30/22 120.2 kg (265 lb)        Anesthesia Evaluation   Pt has had prior anesthetic. Type: General and MAC.    No history of anesthetic complications       ROS/MED HX  ENT/Pulmonary:     (+) asthma Treatment: Inhaler prn and Inhaler daily,      Neurologic:       Cardiovascular: Comment: Nonischemic cardiomyopathy   PVCs    (+) hypertension-----CHF ICD Reason placed:v fib cardiac arrest. dysrhythmias (ablation x2 for afib and ventricular arrhythmia ), a-fib and Other, Previous cardiac testing   Echo: Date: 19 Results:  Final Conclusion Previous Study: 19    1. No thrombus in the left atrial appendage.      2.  Device lead(s) identified in the right atrium and right ventricle. Small mobile   echodensities seen on atrial portion of the lead similar to    prior JR. "      3. Left ventricular chamber enlargement.Moderate to severely decreased left ventricular   systolic function.    Estimated left ventricular ejection fraction is 30-35%.      4. Normal right ventricular chamber size.Mildly decreased right ventricular systolic function.    Estimated right ventricular systolic pressure is 22.5 mmHg plus right atrial pressure.      5. Mildly thickened mitral valve.Mild-moderate mitral valve regurgitation.      6. Patent foramen ovale with bidirectional shunt.    Mid septal perforation ( due to prior instrumentation) and bidirectional shunt.      7.When compared to the previous echocardiographic images of 6/20/19, there has been no   significant change.    LV function may be underestimated due to tachycardia.    Estimated EF: 30-35%     Stress Test: Date: Results:    ECG Reviewed: Date: 9/19/21 Results:  Atrial fibrillation   -Left bundle branch block and left axis    Sinus rhythm w/ L BBB following cardioversion  Cath: Date: Results:      METS/Exercise Tolerance:     Hematologic:     (+) history of blood transfusion,     Musculoskeletal:       GI/Hepatic:       Renal/Genitourinary:       Endo:     (+) Obesity,     Psychiatric/Substance Use:       Infectious Disease:       Malignancy:       Other:            Physical Exam    Airway        Mallampati: II   TM distance: > 3 FB   Neck ROM: full   Mouth opening: > 3 cm    Respiratory Devices and Support         Dental  no notable dental history         Cardiovascular   cardiovascular exam normal          Pulmonary   pulmonary exam normal                OUTSIDE LABS:  CBC:   Lab Results   Component Value Date    WBC 7.9 09/19/2021    WBC 7.8 08/23/2014    HGB 13.3 09/19/2021    HGB 13.9 08/23/2014    HCT 40.2 09/19/2021    HCT 43.4 08/23/2014     09/19/2021     08/23/2014     BMP:   Lab Results   Component Value Date     09/19/2021     09/08/2015    POTASSIUM 3.7 09/19/2021    POTASSIUM 4.0 09/08/2015    CHLORIDE  110 (H) 09/19/2021    CHLORIDE 104 09/08/2015    CO2 25 09/19/2021    CO2 27 09/08/2015    BUN 13 09/19/2021    BUN 11 09/08/2015    CR 0.75 09/19/2021    CR 0.84 09/08/2015    GLC 99 09/19/2021    GLC 93 09/08/2015     COAGS:   Lab Results   Component Value Date    PTT 49 (H) 09/19/2021    INR 2.12 (H) 09/19/2021     POC:   Lab Results   Component Value Date    BGM 98 07/20/2011     HEPATIC:   Lab Results   Component Value Date    ALBUMIN 3.6 09/19/2021    PROTTOTAL 7.4 09/19/2021    ALT 26 09/19/2021    AST 12 09/19/2021    ALKPHOS 94 09/19/2021    BILITOTAL 0.4 09/19/2021     OTHER:   Lab Results   Component Value Date    PH 7.39 07/19/2011    LACT 0.7 07/20/2011    MARLY 8.4 (L) 09/19/2021    PHOS 3.8 07/20/2011    MAG 1.6 07/23/2011    TSH 2.19 09/19/2021       Anesthesia Plan    ASA Status:  3, emergent    NPO Status:  NPO Appropriate    Anesthesia Type: MAC.              Consents    Anesthesia Plan(s) and associated risks, benefits, and realistic alternatives discussed. Questions answered and patient/representative(s) expressed understanding.    - Discussed:     - Discussed with:  Patient         Postoperative Care            Comments:                    PAUL Gallegos CRNA

## 2022-11-14 ENCOUNTER — APPOINTMENT (OUTPATIENT)
Dept: ULTRASOUND IMAGING | Facility: CLINIC | Age: 52
End: 2022-11-14
Attending: FAMILY MEDICINE
Payer: COMMERCIAL

## 2022-11-14 ENCOUNTER — HOSPITAL ENCOUNTER (EMERGENCY)
Facility: CLINIC | Age: 52
Discharge: HOME | End: 2022-11-14
Attending: FAMILY MEDICINE | Admitting: FAMILY MEDICINE
Payer: COMMERCIAL

## 2022-11-14 VITALS
SYSTOLIC BLOOD PRESSURE: 175 MMHG | HEART RATE: 78 BPM | DIASTOLIC BLOOD PRESSURE: 84 MMHG | TEMPERATURE: 97.4 F | RESPIRATION RATE: 18 BRPM | BODY MASS INDEX: 46.94 KG/M2 | WEIGHT: 265 LBS | OXYGEN SATURATION: 98 %

## 2022-11-14 DIAGNOSIS — I80.8 SUPERFICIAL THROMBOPHLEBITIS OF LEFT UPPER EXTREMITY: ICD-10-CM

## 2022-11-14 PROCEDURE — 99284 EMERGENCY DEPT VISIT MOD MDM: CPT | Mod: 25 | Performed by: FAMILY MEDICINE

## 2022-11-14 PROCEDURE — 99282 EMERGENCY DEPT VISIT SF MDM: CPT | Performed by: FAMILY MEDICINE

## 2022-11-14 PROCEDURE — 93971 EXTREMITY STUDY: CPT | Mod: LT

## 2022-11-14 ASSESSMENT — ACTIVITIES OF DAILY LIVING (ADL): ADLS_ACUITY_SCORE: 37

## 2022-11-14 NOTE — Clinical Note
Rocío Key was seen and treated in our emergency department on 11/14/2022.  She may return to work on 11/15/2022.  may be unable to type due  to wrist/hand medical concern - this may be  a factor for 7 days,     If you have any questions or concerns, please don't hesitate to call.      Paul Lackey MD

## 2022-11-14 NOTE — ED NOTES
IV removed on Thursday, swelling present on Friday.  Painful to touch distally from IV insertion site.  Pt states she has used ice over the weekend.  Pt was in hospital for new onset of afib.  Pt is A&Ox4.  VSS.  Afebrile.

## 2022-11-14 NOTE — ED PROVIDER NOTES
"  History     Chief Complaint   Patient presents with     Hand Problem     HPI  Rocío Key is a 52 year old female who presents with a history of atrial fibrillation hypertension prior V. fib cardiac arrest.  Implanted defibrillator history of ablation for ventricular arrhythmia admitted to [facility] for atrial fibrillation for 2 days from last Wednesday to Friday had an IV in place some pain at the IV site proximal to the wrist crease dorsum.  Since the IV has been removed she is developed swelling minimal erythema in the region.  No associated fever pain in this region.  She has no recent erythema up the arm.  He has no history of VTE.    Allergies:  Allergies   Allergen Reactions     Codeine Other (See Comments)     \"inhibits my breathing, I gasp\"       Problem List:    Patient Active Problem List    Diagnosis Date Noted     Chronic systolic congestive heart failure (H) 03/02/2016     Priority: Medium     Chronic systolic (congestive) heart failure (H) 11/08/2015     Priority: Medium     Nonischemic cardiomyopathy (H) 08/06/2015     Priority: Medium     S/P ablation of ventricular arrhythmia 08/06/2015     Priority: Medium     PVC ablation       PVC's (premature ventricular contractions) 02/20/2014     Priority: Medium     Hypertension      Priority: Medium     Atrial fibrillation (H)      Priority: Medium     Cardiac arrest - ventricular fibrillation      Priority: Medium     Problem list name updated by automated process. Provider to review and confirm       Automatic implantable cardioverter-defibrillator in situ 07/22/2011     Priority: Medium     RA lead revision 12/5/11  Problem list name updated by automated process. Provider to review       Respiratory failure (H) 07/19/2011     Priority: Medium        Past Medical History:    Past Medical History:   Diagnosis Date     Asthma      Atrial fibrillation (H) 2005     Blood transfusion 11/2000     Cardiac arrest - ventricular fibrillation 7/2011 "     Cardiac ICD- [employer], dual chamber- NOT dependant 2011     Chronic systolic congestive heart failure (H) 3/2/2016     Hypertension      Nonischemic cardiomyopathy (H) 2015     Obesity      S/P ablation of ventricular arrhythmia 2015       Past Surgical History:    Past Surgical History:   Procedure Laterality Date     CARDIAC SURGERY  11    AICD      GYN SURGERY           H LEAD REVISION DUAL  2011    RA & RV leads     TUBAL LIGATION         Family History:    Family History   Problem Relation Age of Onset     Cardiovascular Maternal Grandmother         MI     Cardiovascular Maternal Grandfather         MI     Cancer Father 63        Brain cancer       Social History:  Marital Status:   [2]  Social History     Tobacco Use     Smoking status: Never     Smokeless tobacco: Never   Substance Use Topics     Alcohol use: Yes     Comment: occasionally     Drug use: No        Medications:    ADVAIR DISKUS 250-50 MCG/DOSE diskus inhaler  albuterol (PROAIR HFA) 108 (90 BASE) MCG/ACT inhaler  ASPIRIN PO  Calcium Carbonate (CALCIUM 600 PO)  cholecalciferol (VITAMIN D3) 1000 UNIT capsule  diphenhydrAMINE-acetaminophen (TYLENOL PM EXTRA STRENGTH)  MG tablet  famotidine (PEPCID) 20 MG tablet  losartan (COZAAR) 50 MG tablet  metoprolol (TOPROL-XL) 100 MG 24 hr tablet  montelukast (SINGULAIR) 10 MG tablet  rivaroxaban ANTICOAGULANT (XARELTO) 20 MG TABS tablet  sertraline (ZOLOFT) 50 MG tablet          Review of Systems   ROS:  5 point ROS negative except as noted above in HPI, including Gen., Resp., CV, GI &  system review.      Physical Exam   BP: (!) 175/84  Pulse: 78  Temp: 97.4  F (36.3  C)  Resp: 18  Weight: 120.2 kg (265 lb)  SpO2: 98 %      Physical Exam   Radial pulses are symmetric.  There is a swelling that is more generalized in the dorsum of the left wrist.  There is no obvious significant erythema there may be a very slight amount of erythema overlying this region  but not at the site of insertion.  She notes this but it is more of a possible pinkish discoloration and no induration no signs of infection.  I do not detect an obvious fluctuance or organized mass below the surface.  She has normal ulnar median radial nerve function motor or sensory.  Remainder of her examination is reassuring.  Lungs are clear to auscultation.  Heart regular rate and rhythm.    ED Course                 Procedures              Critical Care time:  none               Results for orders placed or performed during the hospital encounter of 11/14/22 (from the past 24 hour(s))   US Upper Extremity Venous Duplex Left    Narrative    ULTRASOUND LEFT UPPER EXTREMITY VENOUS DUPLEX  11/14/2022 11:07 AM    CLINICAL HISTORY: Left arm swelling following IV placement. Evaluate  for DVT, superficial thrombophlebitis.    TECHNIQUE: Venous Duplex ultrasound of the left upper extremity with  (when possible) and without compression, augmentation, and duplex.  Color flow and spectral Doppler with waveform analysis performed.    COMPARISON: None.    FINDINGS: Ultrasound includes evaluation of the internal jugular vein,  innominate vein, subclavian vein, axillary vein, and brachial vein.  The superficial cephalic and basilic veins were also evaluated where  seen.     LEFT: No deep venous thrombosis. Positive occlusive superficial venous  thrombus within the cephalic vein in the region of swelling at the  left hand.       Impression    IMPRESSION: Positive occlusive superficial venous thrombosis at the  cephalic vein in the region of swelling at the left hand. No deep  venous system thrombosis identified.    TANIA WOOD MD         SYSTEM ID:  [id number]       Medications - No data to display    Assessments & Plan (with Medical Decision Making)     MDM; Rocío DONOHUE Collins Key is a 52 year old female who presents with a history that is complex and prior ventricular tachycardia ventricular fibrillation arrest has a  defibrillator in place and recently had A. fib and converted last week.  Has been home and her only complaint now is swelling at the dorsal wrist and hand.  No obvious signs of infection although tender to touch no obvious abscess fluctuation induration.  She has no obvious cords that I can palpate and she is on Eliquis for her atrial fibrillation.  We will still plan for an ultrasound of the region to exclude DVT or superficial thrombophlebitis.    We discussed the distal superficial clot.  I made recommendations as below.  No change in the dosing on her DOAC.  This is a lower dose than would use for VTE but I typically would not treat this distal superficial thrombophlebitis with anticoagulation.  It does have the potential risk of propagating but less likely because she is on DOAC.  We discussed management as below.  I given precautions for return.  We discussed symptomatic management of this.  I have reviewed the nursing notes.    I have reviewed the findings, diagnosis, plan and need for follow up with the patient.       New Prescriptions    No medications on file       Final diagnoses:   Superficial thrombophlebitis of left upper extremity - wam soaks to the area. this should clear ralph.  as you arre on a fib dose xarelto (lower dose than we would use for a DVT).  recheck in clinic on friday as scheduled.  superficial clots can propogate up the arm to the deep system but this one should resolve without complications,       11/14/2022   [facility] EMERGENCY DEPT     Paul Lackey MD  11/14/22 [id number]

## 2022-11-14 NOTE — DISCHARGE INSTRUCTIONS
ICD-10-CM    1. Superficial thrombophlebitis of left upper extremity  I80.8     wam soaks to the area. this should clear ralph.  as you arre on a fib dose xarelto (lower dose than we would use for a DVT).  recheck in clinic on friday as scheduled.  superficial clots can propogate up the arm to the deep system but this one should resolve without complications,

## 2023-04-01 ENCOUNTER — HEALTH MAINTENANCE LETTER (OUTPATIENT)
Age: 53
End: 2023-04-01

## 2023-06-01 ENCOUNTER — HEALTH MAINTENANCE LETTER (OUTPATIENT)
Age: 53
End: 2023-06-01

## 2023-07-21 ENCOUNTER — APPOINTMENT (OUTPATIENT)
Dept: ULTRASOUND IMAGING | Facility: CLINIC | Age: 53
End: 2023-07-21
Attending: EMERGENCY MEDICINE
Payer: COMMERCIAL

## 2023-07-21 ENCOUNTER — HOSPITAL ENCOUNTER (EMERGENCY)
Facility: CLINIC | Age: 53
Discharge: HOME | End: 2023-07-21
Attending: EMERGENCY MEDICINE | Admitting: EMERGENCY MEDICINE
Payer: COMMERCIAL

## 2023-07-21 ENCOUNTER — TELEPHONE (OUTPATIENT)
Dept: OBGYN | Facility: CLINIC | Age: 53
End: 2023-07-21

## 2023-07-21 VITALS
RESPIRATION RATE: 18 BRPM | HEIGHT: 63 IN | HEART RATE: 66 BPM | SYSTOLIC BLOOD PRESSURE: 151 MMHG | BODY MASS INDEX: 46.07 KG/M2 | WEIGHT: 260 LBS | TEMPERATURE: 97.5 F | DIASTOLIC BLOOD PRESSURE: 85 MMHG | OXYGEN SATURATION: 97 %

## 2023-07-21 DIAGNOSIS — R93.89 ABNORMAL PELVIC ULTRASOUND: ICD-10-CM

## 2023-07-21 DIAGNOSIS — Z79.01 CHRONIC ANTICOAGULATION: ICD-10-CM

## 2023-07-21 DIAGNOSIS — N95.0 POSTMENOPAUSE BLEEDING: ICD-10-CM

## 2023-07-21 LAB
ABO/RH(D): NORMAL
ANION GAP SERPL CALCULATED.3IONS-SCNC: 7 MMOL/L (ref 7–15)
ANTIBODY SCREEN: NEGATIVE
BASOPHILS # BLD AUTO: 0 10E3/UL (ref 0–0.2)
BASOPHILS NFR BLD AUTO: 1 %
BUN SERPL-MCNC: 15.8 MG/DL (ref 6–20)
CALCIUM SERPL-MCNC: 9.5 MG/DL (ref 8.6–10)
CHLORIDE SERPL-SCNC: 106 MMOL/L (ref 98–107)
CREAT SERPL-MCNC: 0.75 MG/DL (ref 0.51–0.95)
DEPRECATED HCO3 PLAS-SCNC: 28 MMOL/L (ref 22–29)
EOSINOPHIL # BLD AUTO: 0.1 10E3/UL (ref 0–0.7)
EOSINOPHIL NFR BLD AUTO: 2 %
ERYTHROCYTE [DISTWIDTH] IN BLOOD BY AUTOMATED COUNT: 12.8 % (ref 10–15)
GFR SERPL CREATININE-BSD FRML MDRD: >90 ML/MIN/1.73M2
GLUCOSE SERPL-MCNC: 100 MG/DL (ref 70–99)
HCT VFR BLD AUTO: 40.3 % (ref 35–47)
HGB BLD-MCNC: 13 G/DL (ref 11.7–15.7)
IMM GRANULOCYTES # BLD: 0 10E3/UL
IMM GRANULOCYTES NFR BLD: 0 %
LYMPHOCYTES # BLD AUTO: 1.5 10E3/UL (ref 0.8–5.3)
LYMPHOCYTES NFR BLD AUTO: 24 %
MCH RBC QN AUTO: 30.7 PG (ref 26.5–33)
MCHC RBC AUTO-ENTMCNC: 32.3 G/DL (ref 31.5–36.5)
MCV RBC AUTO: 95 FL (ref 78–100)
MONOCYTES # BLD AUTO: 0.3 10E3/UL (ref 0–1.3)
MONOCYTES NFR BLD AUTO: 5 %
NEUTROPHILS # BLD AUTO: 4.3 10E3/UL (ref 1.6–8.3)
NEUTROPHILS NFR BLD AUTO: 68 %
NRBC # BLD AUTO: 0 10E3/UL
NRBC BLD AUTO-RTO: 0 /100
PLATELET # BLD AUTO: 304 10E3/UL (ref 150–450)
POTASSIUM SERPL-SCNC: 4.4 MMOL/L (ref 3.4–5.3)
RBC # BLD AUTO: 4.24 10E6/UL (ref 3.8–5.2)
SODIUM SERPL-SCNC: 141 MMOL/L (ref 136–145)
SPECIMEN EXPIRATION DATE: NORMAL
WBC # BLD AUTO: 6.3 10E3/UL (ref 4–11)

## 2023-07-21 PROCEDURE — 258N000003 HC RX IP 258 OP 636: Performed by: EMERGENCY MEDICINE

## 2023-07-21 PROCEDURE — 99284 EMERGENCY DEPT VISIT MOD MDM: CPT | Mod: 25 | Performed by: EMERGENCY MEDICINE

## 2023-07-21 PROCEDURE — 82310 ASSAY OF CALCIUM: CPT | Performed by: EMERGENCY MEDICINE

## 2023-07-21 PROCEDURE — 99284 EMERGENCY DEPT VISIT MOD MDM: CPT | Performed by: EMERGENCY MEDICINE

## 2023-07-21 PROCEDURE — 76830 TRANSVAGINAL US NON-OB: CPT

## 2023-07-21 PROCEDURE — 86901 BLOOD TYPING SEROLOGIC RH(D): CPT | Performed by: EMERGENCY MEDICINE

## 2023-07-21 PROCEDURE — 85025 COMPLETE CBC W/AUTO DIFF WBC: CPT | Performed by: EMERGENCY MEDICINE

## 2023-07-21 PROCEDURE — 36415 COLL VENOUS BLD VENIPUNCTURE: CPT | Performed by: EMERGENCY MEDICINE

## 2023-07-21 PROCEDURE — 96360 HYDRATION IV INFUSION INIT: CPT | Performed by: EMERGENCY MEDICINE

## 2023-07-21 PROCEDURE — 86850 RBC ANTIBODY SCREEN: CPT | Performed by: EMERGENCY MEDICINE

## 2023-07-21 PROCEDURE — 96361 HYDRATE IV INFUSION ADD-ON: CPT | Mod: 59 | Performed by: EMERGENCY MEDICINE

## 2023-07-21 RX ORDER — TRANEXAMIC ACID 650 MG/1
1300 TABLET ORAL 2 TIMES DAILY
Qty: 12 TABLET | Refills: 0 | Status: SHIPPED | OUTPATIENT
Start: 2023-07-21 | End: 2023-07-24

## 2023-07-21 RX ORDER — SODIUM CHLORIDE 9 MG/ML
1000 INJECTION, SOLUTION INTRAVENOUS CONTINUOUS
Status: DISCONTINUED | OUTPATIENT
Start: 2023-07-21 | End: 2023-07-21 | Stop reason: HOSPADM

## 2023-07-21 RX ADMIN — SODIUM CHLORIDE 500 ML: 9 INJECTION, SOLUTION INTRAVENOUS at 08:15

## 2023-07-21 ASSESSMENT — ENCOUNTER SYMPTOMS
CONSTITUTIONAL NEGATIVE: 1
HEMATOLOGIC/LYMPHATIC NEGATIVE: 1
CARDIOVASCULAR NEGATIVE: 1
GASTROINTESTINAL NEGATIVE: 1
ALLERGIC/IMMUNOLOGIC NEGATIVE: 1
EYES NEGATIVE: 1
NEUROLOGICAL NEGATIVE: 1
PSYCHIATRIC NEGATIVE: 1
ENDOCRINE NEGATIVE: 1
RESPIRATORY NEGATIVE: 1
MUSCULOSKELETAL NEGATIVE: 1

## 2023-07-21 ASSESSMENT — ACTIVITIES OF DAILY LIVING (ADL): ADLS_ACUITY_SCORE: 37
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
## 2023-07-21 NOTE — ED TRIAGE NOTES
Pt here with vaginal bleeding since Wednesday. Pt has not had her period in two years. The bleeding is getting worse, she was up every hour tonight. Slight tenderness to lower abdomen.      Triage Assessment       Row Name 07/21/23 0742       Triage Assessment (Adult)    Airway WDL WDL       Respiratory WDL    Respiratory WDL WDL       Cardiac WDL    Cardiac WDL WDL       Cognitive/Neuro/Behavioral WDL    Cognitive/Neuro/Behavioral WDL WDL

## 2023-07-21 NOTE — ED NOTES
Patient verbalized vaginal bleeding for past 2 days.  Denies pain, reports occasional abdominal cramping.  Patient verbalized fall out of chair 2 weeks ago.

## 2023-07-21 NOTE — DISCHARGE INSTRUCTIONS
1) Your patient today revealed that your bleeding is coming from a thickened endometrium that require further diagnostic work-up including biopsy to be coordinated by gynecology.  After discussion with the gynecologist on-call plan is for the team to call you for an appointment next week.    2) Pending evaluation with gynecology you are placed on a medication-TXA to take twice a day over the next 5 days to help with bleeding.    3) We have discussed the importance of notifying the team prior to your biopsy that you are on anticoagulation with  Xarelto for history of atrial fibrillation.    4) You appear stable for discharge to home at this time however if you develop lightheadedness or fainting, or any new concerns you should return to be evaluated

## 2023-07-21 NOTE — TELEPHONE ENCOUNTER
----- Message from Nuvia Erickson DO sent at 7/21/2023 10:05 AM CDT -----  Regarding: ER follow up  This patient was in the ED today and needs OBGYN follow up. She needs to be seen for a consult and endometrial biopsy. Can you please reach out to her to see if she has an OBGYN to see, or help her get scheduled for us. I can see her when on call on 8/3 in the PM, or if anyone else has earlier follow up.    Thanks,  Nuvia Erickson DO

## 2023-07-21 NOTE — TELEPHONE ENCOUNTER
Calling pt to schedule ED follow-up consult and EMB    Pt scheduled for 1430 on 8/3/23 with Dr. Georgina Delarosa, RN  [facility]

## 2023-07-21 NOTE — LETTER
July 21, 2023      To Whom It May Concern:      Rocío Key was seen in our Emergency Department today, 07/21/23. Please excuse for missing work due to needing to be evaluated in the emergency department.  In the event that symptoms worsen she may need to return to be evaluated.  This work note is valid until 7/31/23 pending further evaluation and care.      Sincerely,             Temo Hyman MD

## 2023-07-24 NOTE — TELEPHONE ENCOUNTER
Patient calling back today to mention that she forgot to disclose at the time of scheduling that she is currently taking Xarelto and would like to discuss concerns with that medication prior to undergoing EMB on 08/03/23.     Informed patient I would send a message to the [place] RNs and they would give patient a call back.     Routing to [place] Triage.       Samira Robbins/her/hers  [facility] Scheduler

## 2023-07-24 NOTE — TELEPHONE ENCOUNTER
Return call to patient.  Spoke with patient on the phone.    Notified patient that she can discuss further with Dr. Erickson at office visit.  Typically an EMB can be completed while on the medication and nothing further is needed.     Pt in agreement and reports understanding.    Tana CORDOVA   [facility]

## 2023-08-09 ENCOUNTER — TELEPHONE (OUTPATIENT)
Dept: OBGYN | Facility: CLINIC | Age: 53
End: 2023-08-09
Payer: COMMERCIAL

## 2024-06-02 ENCOUNTER — HEALTH MAINTENANCE LETTER (OUTPATIENT)
Age: 54
End: 2024-06-02

## 2024-06-16 ENCOUNTER — HOSPITAL ENCOUNTER (EMERGENCY)
Facility: CLINIC | Age: 54
Discharge: HOME | End: 2024-06-16
Attending: STUDENT IN AN ORGANIZED HEALTH CARE EDUCATION/TRAINING PROGRAM | Admitting: STUDENT IN AN ORGANIZED HEALTH CARE EDUCATION/TRAINING PROGRAM
Payer: COMMERCIAL

## 2024-06-16 ENCOUNTER — ANESTHESIA (OUTPATIENT)
Dept: EMERGENCY MEDICINE | Facility: CLINIC | Age: 54
End: 2024-06-16
Payer: COMMERCIAL

## 2024-06-16 ENCOUNTER — ANESTHESIA EVENT (OUTPATIENT)
Dept: EMERGENCY MEDICINE | Facility: CLINIC | Age: 54
End: 2024-06-16
Payer: COMMERCIAL

## 2024-06-16 VITALS
RESPIRATION RATE: 13 BRPM | BODY MASS INDEX: 46.06 KG/M2 | WEIGHT: 260 LBS | SYSTOLIC BLOOD PRESSURE: 133 MMHG | DIASTOLIC BLOOD PRESSURE: 77 MMHG | HEART RATE: 71 BPM | TEMPERATURE: 97.6 F | OXYGEN SATURATION: 95 %

## 2024-06-16 DIAGNOSIS — I48.91 ATRIAL FIBRILLATION WITH RAPID VENTRICULAR RESPONSE (H): ICD-10-CM

## 2024-06-16 LAB
ANION GAP SERPL CALCULATED.3IONS-SCNC: 13 MMOL/L (ref 7–15)
BASOPHILS # BLD AUTO: 0.1 10E3/UL (ref 0–0.2)
BASOPHILS NFR BLD AUTO: 1 %
BUN SERPL-MCNC: 9.7 MG/DL (ref 6–20)
CALCIUM SERPL-MCNC: 9.1 MG/DL (ref 8.6–10)
CHLORIDE SERPL-SCNC: 106 MMOL/L (ref 98–107)
CREAT SERPL-MCNC: 0.68 MG/DL (ref 0.51–0.95)
DEPRECATED HCO3 PLAS-SCNC: 22 MMOL/L (ref 22–29)
EGFRCR SERPLBLD CKD-EPI 2021: >90 ML/MIN/1.73M2
EOSINOPHIL # BLD AUTO: 0.1 10E3/UL (ref 0–0.7)
EOSINOPHIL NFR BLD AUTO: 2 %
ERYTHROCYTE [DISTWIDTH] IN BLOOD BY AUTOMATED COUNT: 12.8 % (ref 10–15)
GLUCOSE SERPL-MCNC: 119 MG/DL (ref 70–99)
HCT VFR BLD AUTO: 42.9 % (ref 35–47)
HGB BLD-MCNC: 14.4 G/DL (ref 11.7–15.7)
IMM GRANULOCYTES # BLD: 0 10E3/UL
IMM GRANULOCYTES NFR BLD: 0 %
INR PPP: 0.88 (ref 0.85–1.15)
LYMPHOCYTES # BLD AUTO: 1.6 10E3/UL (ref 0.8–5.3)
LYMPHOCYTES NFR BLD AUTO: 24 %
MCH RBC QN AUTO: 31.4 PG (ref 26.5–33)
MCHC RBC AUTO-ENTMCNC: 33.6 G/DL (ref 31.5–36.5)
MCV RBC AUTO: 94 FL (ref 78–100)
MONOCYTES # BLD AUTO: 0.3 10E3/UL (ref 0–1.3)
MONOCYTES NFR BLD AUTO: 4 %
NEUTROPHILS # BLD AUTO: 4.6 10E3/UL (ref 1.6–8.3)
NEUTROPHILS NFR BLD AUTO: 68 %
NRBC # BLD AUTO: 0 10E3/UL
NRBC BLD AUTO-RTO: 0 /100
PLATELET # BLD AUTO: 328 10E3/UL (ref 150–450)
POTASSIUM SERPL-SCNC: 4.2 MMOL/L (ref 3.4–5.3)
RBC # BLD AUTO: 4.59 10E6/UL (ref 3.8–5.2)
SODIUM SERPL-SCNC: 141 MMOL/L (ref 135–145)
WBC # BLD AUTO: 6.8 10E3/UL (ref 4–11)

## 2024-06-16 PROCEDURE — 250N000011 HC RX IP 250 OP 636: Performed by: NURSE ANESTHETIST, CERTIFIED REGISTERED

## 2024-06-16 PROCEDURE — 93010 ELECTROCARDIOGRAM REPORT: CPT | Mod: 59 | Performed by: STUDENT IN AN ORGANIZED HEALTH CARE EDUCATION/TRAINING PROGRAM

## 2024-06-16 PROCEDURE — 99285 EMERGENCY DEPT VISIT HI MDM: CPT | Mod: 25 | Performed by: STUDENT IN AN ORGANIZED HEALTH CARE EDUCATION/TRAINING PROGRAM

## 2024-06-16 PROCEDURE — 80048 BASIC METABOLIC PNL TOTAL CA: CPT | Performed by: STUDENT IN AN ORGANIZED HEALTH CARE EDUCATION/TRAINING PROGRAM

## 2024-06-16 PROCEDURE — 92960 CARDIOVERSION ELECTRIC EXT: CPT | Performed by: STUDENT IN AN ORGANIZED HEALTH CARE EDUCATION/TRAINING PROGRAM

## 2024-06-16 PROCEDURE — 99156 MOD SED OTH PHYS/QHP 5/>YRS: CPT | Mod: 59 | Performed by: STUDENT IN AN ORGANIZED HEALTH CARE EDUCATION/TRAINING PROGRAM

## 2024-06-16 PROCEDURE — 85025 COMPLETE CBC W/AUTO DIFF WBC: CPT | Performed by: STUDENT IN AN ORGANIZED HEALTH CARE EDUCATION/TRAINING PROGRAM

## 2024-06-16 PROCEDURE — 36415 COLL VENOUS BLD VENIPUNCTURE: CPT | Performed by: STUDENT IN AN ORGANIZED HEALTH CARE EDUCATION/TRAINING PROGRAM

## 2024-06-16 PROCEDURE — 370N000003 HC ANESTHESIA WARD SERVICE: Performed by: NURSE ANESTHETIST, CERTIFIED REGISTERED

## 2024-06-16 PROCEDURE — 85610 PROTHROMBIN TIME: CPT | Performed by: STUDENT IN AN ORGANIZED HEALTH CARE EDUCATION/TRAINING PROGRAM

## 2024-06-16 PROCEDURE — 93005 ELECTROCARDIOGRAM TRACING: CPT | Mod: XU | Performed by: STUDENT IN AN ORGANIZED HEALTH CARE EDUCATION/TRAINING PROGRAM

## 2024-06-16 PROCEDURE — 258N000003 HC RX IP 258 OP 636: Mod: JZ | Performed by: NURSE ANESTHETIST, CERTIFIED REGISTERED

## 2024-06-16 RX ORDER — PROPOFOL 10 MG/ML
INJECTION, EMULSION INTRAVENOUS PRN
Status: DISCONTINUED | OUTPATIENT
Start: 2024-06-16 | End: 2024-06-16

## 2024-06-16 RX ORDER — SODIUM CHLORIDE, SODIUM LACTATE, POTASSIUM CHLORIDE, CALCIUM CHLORIDE 600; 310; 30; 20 MG/100ML; MG/100ML; MG/100ML; MG/100ML
INJECTION, SOLUTION INTRAVENOUS CONTINUOUS PRN
Status: DISCONTINUED | OUTPATIENT
Start: 2024-06-16 | End: 2024-06-16

## 2024-06-16 RX ORDER — PROPOFOL 10 MG/ML
INJECTION, EMULSION INTRAVENOUS
Status: COMPLETED
Start: 2024-06-16 | End: 2024-06-16

## 2024-06-16 RX ADMIN — SODIUM CHLORIDE, POTASSIUM CHLORIDE, SODIUM LACTATE AND CALCIUM CHLORIDE: 600; 310; 30; 20 INJECTION, SOLUTION INTRAVENOUS at 12:08

## 2024-06-16 RX ADMIN — PROPOFOL 100 MG: 10 INJECTION, EMULSION INTRAVENOUS at 12:16

## 2024-06-16 ASSESSMENT — COLUMBIA-SUICIDE SEVERITY RATING SCALE - C-SSRS
2. HAVE YOU ACTUALLY HAD ANY THOUGHTS OF KILLING YOURSELF IN THE PAST MONTH?: NO
6. HAVE YOU EVER DONE ANYTHING, STARTED TO DO ANYTHING, OR PREPARED TO DO ANYTHING TO END YOUR LIFE?: NO
1. IN THE PAST MONTH, HAVE YOU WISHED YOU WERE DEAD OR WISHED YOU COULD GO TO SLEEP AND NOT WAKE UP?: NO

## 2024-06-16 ASSESSMENT — ACTIVITIES OF DAILY LIVING (ADL)
ADLS_ACUITY_SCORE: 37
ADLS_ACUITY_SCORE: 37

## 2024-06-16 NOTE — ANESTHESIA PREPROCEDURE EVALUATION
"Anesthesia Pre-Procedure Evaluation    Patient: Rocío Key   MRN: [id number] : 1970        Procedure :           Past Medical History:   Diagnosis Date    Asthma     Atrial fibrillation (H)     Cardioverted in     Blood transfusion 2000    Cardiac arrest - ventricular fibrillation 2011    Cardiac ICD- [employer], dual chamber- NOT dependant 2011    Chronic systolic congestive heart failure (H) 3/2/2016    Hypertension     Nonischemic cardiomyopathy (H) 2015    Obesity     Lost 100lbs in     S/P ablation of ventricular arrhythmia 2015    PVC ablation      Past Surgical History:   Procedure Laterality Date    CARDIAC SURGERY  11    AICD     GYN SURGERY          H LEAD REVISION DUAL  2011    RA & RV leads    TUBAL LIGATION        Allergies   Allergen Reactions    Codeine Other (See Comments)     \"inhibits my breathing, I gasp\"    Other (Do Not Use) Rash      Social History     Tobacco Use    Smoking status: Never    Smokeless tobacco: Never   Substance Use Topics    Alcohol use: Yes     Comment: occasionally      Wt Readings from Last 1 Encounters:   24 117.9 kg (260 lb)        Anesthesia Evaluation   Pt has had prior anesthetic. Type: General and MAC.        ROS/MED HX  ENT/Pulmonary:     (+)                      asthma                  Neurologic:       Cardiovascular:     (+)  hypertension- -   -  - -      CHF                                METS/Exercise Tolerance:     Hematologic:       Musculoskeletal:       GI/Hepatic:       Renal/Genitourinary:       Endo:     (+)               Obesity,       Psychiatric/Substance Use:       Infectious Disease:       Malignancy:       Other:            Physical Exam    Airway  airway exam normal      Mallampati: I   TM distance: > 3 FB   Neck ROM: full   Mouth opening: > 3 cm    Respiratory Devices and Support         Dental       (+) Minor Abnormalities - some fillings, tiny chips      Cardiovascular   " cardiovascular exam normal       Rhythm and rate: irregular and tachycardia     Pulmonary   pulmonary exam normal        breath sounds clear to auscultation           OUTSIDE LABS:  CBC:   Lab Results   Component Value Date    WBC 6.8 06/16/2024    WBC 6.3 07/21/2023    HGB 14.4 06/16/2024    HGB 13.0 07/21/2023    HCT 42.9 06/16/2024    HCT 40.3 07/21/2023     06/16/2024     07/21/2023     BMP:   Lab Results   Component Value Date     06/16/2024     07/21/2023    POTASSIUM 4.2 06/16/2024    POTASSIUM 4.4 07/21/2023    CHLORIDE 106 06/16/2024    CHLORIDE 106 07/21/2023    CO2 22 06/16/2024    CO2 28 07/21/2023    BUN 9.7 06/16/2024    BUN 15.8 07/21/2023    CR 0.68 06/16/2024    CR 0.75 07/21/2023     (H) 06/16/2024     (H) 07/21/2023     COAGS:   Lab Results   Component Value Date    PTT 49 (H) 09/19/2021    INR 0.88 06/16/2024     POC:   Lab Results   Component Value Date    BGM 98 07/20/2011     HEPATIC:   Lab Results   Component Value Date    ALBUMIN 3.9 10/30/2022    PROTTOTAL 6.9 10/30/2022    ALT 19 10/30/2022    AST 20 10/30/2022    ALKPHOS 103 10/30/2022    BILITOTAL 0.3 10/30/2022     OTHER:   Lab Results   Component Value Date    PH 7.39 07/19/2011    LACT 0.7 07/20/2011    MARLY 9.1 06/16/2024    PHOS 3.8 07/20/2011    MAG 1.6 07/23/2011    TSH 2.19 09/19/2021       Anesthesia Plan    ASA Status:  3    NPO Status:  NPO Appropriate    Anesthesia Type: General.   Induction: Propofol, Intravenous.           Consents    Anesthesia Plan(s) and associated risks, benefits, and realistic alternatives discussed. Questions answered and patient/representative(s) expressed understanding.     - Discussed: Risks, Benefits and Alternatives for BOTH SEDATION and the PROCEDURE were discussed     - Discussed with:  Patient            Postoperative Care            Comments:               PAUL Fraser CRNA    I have reviewed the pertinent notes and labs in the chart from the past  30 days and (re)examined the patient.  Any updates or changes from those notes are reflected in this note.            # Drug Induced Coagulation Defect: home medication list includes an anticoagulant medication  # Drug Induced Platelet Defect: home medication list includes an antiplatelet medication

## 2024-06-16 NOTE — ED PROVIDER NOTES
"  History     Chief Complaint   Patient presents with    Irregular Heart Beat     Pt has history of afib and has been cardioverted many times and has had 3 ablations. Pt felt heart go into afib at 0530 and started feeling shortness of breath and racing skipping beats of heart.      HPI  Rocío Key is a 53 year old female with a documented PMH significant for paroxysmal atrial fibrillation s/p PVI/CTI ablation 8/2019, maintains compliance with anticoagulant Xarelto, non-ischemic cardiomyopathy, VF arrest s/p dual chamber ICD (2011), PVCs s/p ablation 2013, HTN, hyperlipidemia, obesity, depression, and asthma who presents to the department for evaluation of palpitations beginning at around 5:30 AM consistent with history of atrial fibrillation.  Patient explains that she awoke early this morning with urge to urinate and when she got up to return to her bedroom she felt palpitations reminiscent of atrial fibrillation and did not spontaneously convert she eventually came to the department requesting electrocardioversion.  Patient explains that she has had symptoms multiple times in the past and has always been cardioverted in the emergency department, maintains compliance with her anticoagulant, and denies pain or dizziness.  She also has been without fever, chills, sleeplessness, change in alcohol consumption or caffeine/stimulant usage.  No other complaint.        Allergies:  Allergies   Allergen Reactions    Codeine Other (See Comments)     \"inhibits my breathing, I gasp\"    Other (Do Not Use) Rash       Problem List:    Patient Active Problem List    Diagnosis Date Noted    Chronic systolic congestive heart failure (H) 03/02/2016     Priority: Medium    Chronic systolic (congestive) heart failure (H) 11/08/2015     Priority: Medium    Nonischemic cardiomyopathy (H) 08/06/2015     Priority: Medium    S/P ablation of ventricular arrhythmia 08/06/2015     Priority: Medium     PVC ablation      PVC's (premature " ventricular contractions) 2014     Priority: Medium    Hypertension      Priority: Medium    Atrial fibrillation (H)      Priority: Medium    Cardiac arrest - ventricular fibrillation      Priority: Medium     Problem list name updated by automated process. Provider to review and confirm      Automatic implantable cardioverter-defibrillator in situ 2011     Priority: Medium     RA lead revision 11  Problem list name updated by automated process. Provider to review      Respiratory failure (H) 2011     Priority: Medium        Past Medical History:    Past Medical History:   Diagnosis Date    Asthma     Atrial fibrillation (H)     Blood transfusion 2000    Cardiac arrest - ventricular fibrillation 2011    Cardiac ICD- [employer], dual chamber- NOT dependant 2011    Chronic systolic congestive heart failure (H) 3/2/2016    Hypertension     Nonischemic cardiomyopathy (H) 2015    Obesity     S/P ablation of ventricular arrhythmia 2015       Past Surgical History:    Past Surgical History:   Procedure Laterality Date    CARDIAC SURGERY  11    AICD     GYN SURGERY  1998        H LEAD REVISION DUAL  2011    RA & RV leads    TUBAL LIGATION         Family History:    Family History   Problem Relation Age of Onset    Cardiovascular Maternal Grandmother         MI    Cardiovascular Maternal Grandfather         MI    Cancer Father 63        Brain cancer       Social History:  Marital Status:   [2]  Social History     Tobacco Use    Smoking status: Never    Smokeless tobacco: Never   Substance Use Topics    Alcohol use: Yes     Comment: occasionally    Drug use: No        Medications:    ADVAIR DISKUS 250-50 MCG/DOSE diskus inhaler  albuterol (PROAIR HFA) 108 (90 BASE) MCG/ACT inhaler  ASPIRIN PO  Calcium Carbonate (CALCIUM 600 PO)  cholecalciferol (VITAMIN D3) 1000 UNIT capsule  diphenhydrAMINE-acetaminophen (TYLENOL PM EXTRA STRENGTH)  MG  tablet  famotidine (PEPCID) 20 MG tablet  losartan (COZAAR) 50 MG tablet  metoprolol (TOPROL-XL) 100 MG 24 hr tablet  montelukast (SINGULAIR) 10 MG tablet  rivaroxaban ANTICOAGULANT (XARELTO) 20 MG TABS tablet  sertraline (ZOLOFT) 50 MG tablet          Review of Systems   ROS: 10 point ROS neg other than the symptoms noted above in the HPI.    Physical Exam   BP: (!) 126/91  Pulse: 95  Temp: 97.6  F (36.4  C)  Resp: 18  Weight: 117.9 kg (260 lb)  SpO2: 100 %      Physical Exam  Constitutional:  Well developed, well nourished.  Appears nontoxic and in no acute distress.  Resting comfortably on the gurney.  HENT:  Normocephalic and atraumatic.  Symmetric in appearance.  Eyes:  Conjunctivae are normal.  Cardiovascular:  No cyanosis.  Tachycardia with a regular rhythm.  Respiratory:  Effort normal without sign of respiratory distress.  No audible wheezing or stridor.  CTAB.   Gastrointestinal:  Soft nondistended abdomen.  Nontender and without guarding.  No rigidity or rebound tenderness.  Negative Goss's sign.  Negative McBurney's point.    Musculoskeletal:  Moves extremities spontaneously.  Neurological:  Patient is alert.  Skin:  Skin is warm and dry.  Psychiatric:  Normal mood and affect.      ED [facility]    -Cardioversion External    Date/Time: 6/16/2024 12:20 PM    Performed by: Lee Wilkerson DO  Authorized by: Lee Wilkerson DO    Risks, benefits and alternatives discussed.      PRE-PROCEDURE DETAILS:     Cardioversion basis:  Elective    Rhythm:  Atrial fibrillation  Attempt one:     Cardioversion mode:  Synchronous    Shock (Joules):  120    Shock outcome:  Conversion to normal sinus rhythm  Post-procedure details:     Patient status:  Awake      PROCEDURE    Patient Tolerance:  Patient tolerated the procedure well with no immediate complications                  EKG Interpretation:      Interpreted by: Lee Wilkerson  Time reviewed: Upon completion    Symptoms at  time of EKG: Palpitations  Rhythm: Irregular narrow complex  Rate: 139 bpm  Axis: Normal    Conduction: None atypical   ST Segments/ T Waves: No pathologic ST-elevations or T-wave abnormalities.  Q Waves: None    Clinical Impression: Atrial fibrillation with rapid ventricular rate           Results for orders placed or performed during the hospital encounter of 06/16/24 (from the past 24 hour(s))   CBC with platelets differential    Narrative    The following orders were created for panel order CBC with platelets differential.  Procedure                               Abnormality         Status                     ---------                               -----------         ------                     CBC with platelets and d...[[id number]]                      Final result                 Please view results for these tests on the individual orders.   Basic metabolic panel   Result Value Ref Range    Sodium 141 135 - 145 mmol/L    Potassium 4.2 3.4 - 5.3 mmol/L    Chloride 106 98 - 107 mmol/L    Carbon Dioxide (CO2) 22 22 - 29 mmol/L    Anion Gap 13 7 - 15 mmol/L    Urea Nitrogen 9.7 6.0 - 20.0 mg/dL    Creatinine 0.68 0.51 - 0.95 mg/dL    GFR Estimate >90 >60 mL/min/1.73m2    Calcium 9.1 8.6 - 10.0 mg/dL    Glucose 119 (H) 70 - 99 mg/dL   INR   Result Value Ref Range    INR 0.88 0.85 - 1.15   CBC with platelets and differential   Result Value Ref Range    WBC Count 6.8 4.0 - 11.0 10e3/uL    RBC Count 4.59 3.80 - 5.20 10e6/uL    Hemoglobin 14.4 11.7 - 15.7 g/dL    Hematocrit 42.9 35.0 - 47.0 %    MCV 94 78 - 100 fL    MCH 31.4 26.5 - 33.0 pg    MCHC 33.6 31.5 - 36.5 g/dL    RDW 12.8 10.0 - 15.0 %    Platelet Count 328 150 - 450 10e3/uL    % Neutrophils 68 %    % Lymphocytes 24 %    % Monocytes 4 %    % Eosinophils 2 %    % Basophils 1 %    % Immature Granulocytes 0 %    NRBCs per 100 WBC 0 <1 /100    Absolute Neutrophils 4.6 1.6 - 8.3 10e3/uL    Absolute Lymphocytes 1.6 0.8 - 5.3 10e3/uL    Absolute Monocytes 0.3 0.0 -  1.3 10e3/uL    Absolute Eosinophils 0.1 0.0 - 0.7 10e3/uL    Absolute Basophils 0.1 0.0 - 0.2 10e3/uL    Absolute Immature Granulocytes 0.0 <=0.4 10e3/uL    Absolute NRBCs 0.0 10e3/uL       Medications   propofol (DIPRIVAN) 200 MG/20ML injection (has no administration in time range)       Assessments & Plan (with Medical Decision Making)   Rocío Key is a 53 year old female who presents to the department for evaluation of palpitations suspecting another episode of rapid atrial fibrillation.  Initial EKG confirming irregular narrow complex rhythm consistent with atrial fibrillation, patient is without chest pain or shortness of breath, without neurologic deficits, and maintains compliance with home medications including anticoagulant.  Lab work reassuring, patient consented to electrical cardioversion as has been successful in the past.  [facility] provided anesthesia, patient tolerated single electrical cardioversion attempt well and returned to sinus rhythm.  Monitored in the department without complications, recommend outpatient follow-up with cardiology.        Disclaimer:  This note consists of symbols derived from keyboarding, dictation, and/or voice recognition software.  As a result, there may be errors in the script that have gone undetected.  Please consider this when interpreting information found in the chart.        I have reviewed the nursing notes.    I have reviewed the findings, diagnosis, plan and need for follow up with the patient.          New Prescriptions    No medications on file       Final diagnoses:   Atrial fibrillation with rapid ventricular response (H)       6/16/2024   [facility] EMERGENCY DEPT       Lee Wilkerson DO  06/16/24 1057

## 2024-06-16 NOTE — ED TRIAGE NOTES
Pt has history of afib and has been cardioverted many times and has had 3 ablations. Pt felt heart go into afib at 0530 and started feeling shortness of breath and racing skipping beats of heart.      Triage Assessment (Adult)       Row Name 06/16/24 1122          Triage Assessment    Airway WDL WDL        Respiratory WDL    Respiratory WDL WDL        Skin Circulation/Temperature WDL    Skin Circulation/Temperature WDL WDL        Cardiac WDL    Cardiac WDL X;rhythm     Cardiac Rhythm Atrial fibrillation        Peripheral/Neurovascular WDL    Peripheral Neurovascular WDL WDL        Cognitive/Neuro/Behavioral WDL    Cognitive/Neuro/Behavioral WDL WDL

## 2024-10-20 ENCOUNTER — HEALTH MAINTENANCE LETTER (OUTPATIENT)
Age: 54
End: 2024-10-20

## 2025-03-17 NOTE — ED TRIAGE NOTES
IV taken out on Friday after being in for 3 days for an admission, now swelling and pain to left hand, rates pain 12/10, nothing taken for pain due to not wanting to screw up medications already taken     Triage Assessment     Row Name 11/14/22 0914       Triage Assessment (Adult)    Airway WDL WDL       Respiratory WDL    Respiratory WDL WDL       Peripheral/Neurovascular WDL    Peripheral Neurovascular WDL WDL       Cognitive/Neuro/Behavioral WDL    Cognitive/Neuro/Behavioral WDL WDL               Sentin TE